# Patient Record
Sex: FEMALE | Race: WHITE | NOT HISPANIC OR LATINO | Employment: OTHER | URBAN - METROPOLITAN AREA
[De-identification: names, ages, dates, MRNs, and addresses within clinical notes are randomized per-mention and may not be internally consistent; named-entity substitution may affect disease eponyms.]

---

## 2022-07-12 ENCOUNTER — OFFICE VISIT (OUTPATIENT)
Dept: PLASTIC SURGERY | Facility: CLINIC | Age: 72
End: 2022-07-12
Payer: COMMERCIAL

## 2022-07-12 VITALS
HEIGHT: 63 IN | SYSTOLIC BLOOD PRESSURE: 161 MMHG | HEART RATE: 65 BPM | DIASTOLIC BLOOD PRESSURE: 84 MMHG | BODY MASS INDEX: 33.66 KG/M2 | TEMPERATURE: 97.1 F | WEIGHT: 190 LBS

## 2022-07-12 DIAGNOSIS — Z98.890 HISTORY OF BREAST RECONSTRUCTION: Primary | ICD-10-CM

## 2022-07-12 PROCEDURE — 99203 OFFICE O/P NEW LOW 30 MIN: CPT | Performed by: PLASTIC SURGERY

## 2022-07-12 RX ORDER — GABAPENTIN 100 MG/1
CAPSULE ORAL
COMMUNITY
Start: 2022-07-07

## 2022-07-12 RX ORDER — MULTIVITAMIN
TABLET ORAL DAILY
COMMUNITY

## 2022-07-12 RX ORDER — CITALOPRAM 20 MG/1
20 TABLET ORAL DAILY
COMMUNITY
Start: 2022-03-11

## 2022-07-12 RX ORDER — MEDICAL SUPPLY, MISCELLANEOUS
EACH MISCELLANEOUS
COMMUNITY

## 2022-07-12 RX ORDER — FLUTICASONE PROPIONATE 50 MCG
1 SPRAY, SUSPENSION (ML) NASAL DAILY
COMMUNITY

## 2022-07-12 RX ORDER — OMEPRAZOLE 20 MG/1
20 CAPSULE, DELAYED RELEASE ORAL DAILY
COMMUNITY
Start: 2022-06-10

## 2022-07-12 RX ORDER — BIOTIN 5 MG
5000 TABLET ORAL DAILY
COMMUNITY

## 2022-07-12 RX ORDER — DOXEPIN HYDROCHLORIDE 6 MG/1
TABLET ORAL
COMMUNITY
Start: 2022-06-22

## 2022-07-12 RX ORDER — BENZONATATE 100 MG/1
CAPSULE ORAL
COMMUNITY
Start: 2022-05-26

## 2022-07-12 NOTE — PROGRESS NOTES
Patient is a 53-year-old female who is known to me from my previous practice for bilateral breast reconstruction with tissue expander placement, the patient was initially scheduled for bilateral LIVE reconstruction, however the patient needed to be rescheduled due to the COVID-19 pandemic as well as the relocation of my practice, the patient is here today to reestablish care, she is interested in pursuing the next stages of reconstruction  She is no changes to her history, she is no other questions, concerns or complaints  Physical exam-patient with very good shape and contour, she is well-healed mastectomy incisions, her expanders are in good position  Discussion we we discussed the option of autologous reconstruction, the patient states that she would like to be relatively smaller than her current breast size, I discussed with her that this would be possible however she would likely need a flap debulking down the road as I believe that her current abdominal tissue would likely match closely to her current breast volume, otherwise the patient remains very good candidate for autologous flap reconstruction, we will begin to make arrangements for surgical planning      Counseling dominated visit, total counseling time 25 minutes, total of his time 30 minutes

## 2022-09-01 ENCOUNTER — ANESTHESIA EVENT (OUTPATIENT)
Dept: PERIOP | Facility: HOSPITAL | Age: 72
End: 2022-09-01

## 2022-09-21 ENCOUNTER — ANESTHESIA (OUTPATIENT)
Dept: PERIOP | Facility: HOSPITAL | Age: 72
End: 2022-09-21

## 2022-09-29 ENCOUNTER — HOSPITAL ENCOUNTER (OUTPATIENT)
Dept: RADIOLOGY | Facility: HOSPITAL | Age: 72
Discharge: HOME | End: 2022-09-29
Attending: RADIOLOGY

## 2022-09-29 DIAGNOSIS — Z76.89 REFERRAL OF PATIENT WITHOUT EXAMINATION OR TREATMENT: ICD-10-CM

## 2022-10-06 ENCOUNTER — TELEPHONE (OUTPATIENT)
Dept: PLASTIC SURGERY | Facility: CLINIC | Age: 72
End: 2022-10-06

## 2022-10-06 NOTE — TELEPHONE ENCOUNTER
Called patient 10/06 to move pre op appt due to Dr Chandrakant Valdez going into surgery   Can be moved to [facility]

## 2022-10-07 ENCOUNTER — APPOINTMENT (OUTPATIENT)
Dept: LAB | Facility: CLINIC | Age: 72
End: 2022-10-07
Payer: COMMERCIAL

## 2022-10-07 DIAGNOSIS — Z98.890 HISTORY OF BREAST RECONSTRUCTION: ICD-10-CM

## 2022-10-07 LAB
ANION GAP SERPL CALCULATED.3IONS-SCNC: 4 MMOL/L (ref 4–13)
BASOPHILS # BLD AUTO: 0.04 THOUSANDS/ΜL (ref 0–0.1)
BASOPHILS NFR BLD AUTO: 1 % (ref 0–1)
BUN SERPL-MCNC: 11 MG/DL (ref 5–25)
CALCIUM SERPL-MCNC: 9 MG/DL (ref 8.3–10.1)
CHLORIDE SERPL-SCNC: 107 MMOL/L (ref 96–108)
CO2 SERPL-SCNC: 28 MMOL/L (ref 21–32)
CREAT SERPL-MCNC: 1.27 MG/DL (ref 0.6–1.3)
EOSINOPHIL # BLD AUTO: 0.1 THOUSAND/ΜL (ref 0–0.61)
EOSINOPHIL NFR BLD AUTO: 1 % (ref 0–6)
ERYTHROCYTE [DISTWIDTH] IN BLOOD BY AUTOMATED COUNT: 14.1 % (ref 11.6–15.1)
GFR SERPL CREATININE-BSD FRML MDRD: 42 ML/MIN/1.73SQ M
GLUCOSE SERPL-MCNC: 106 MG/DL (ref 65–140)
HCT VFR BLD AUTO: 44.1 % (ref 34.8–46.1)
HGB BLD-MCNC: 13.8 G/DL (ref 11.5–15.4)
IMM GRANULOCYTES # BLD AUTO: 0.02 THOUSAND/UL (ref 0–0.2)
IMM GRANULOCYTES NFR BLD AUTO: 0 % (ref 0–2)
LYMPHOCYTES # BLD AUTO: 2.06 THOUSANDS/ΜL (ref 0.6–4.47)
LYMPHOCYTES NFR BLD AUTO: 27 % (ref 14–44)
MCH RBC QN AUTO: 28.1 PG (ref 26.8–34.3)
MCHC RBC AUTO-ENTMCNC: 31.3 G/DL (ref 31.4–37.4)
MCV RBC AUTO: 90 FL (ref 82–98)
MONOCYTES # BLD AUTO: 0.6 THOUSAND/ΜL (ref 0.17–1.22)
MONOCYTES NFR BLD AUTO: 8 % (ref 4–12)
NEUTROPHILS # BLD AUTO: 4.9 THOUSANDS/ΜL (ref 1.85–7.62)
NEUTS SEG NFR BLD AUTO: 63 % (ref 43–75)
NRBC BLD AUTO-RTO: 0 /100 WBCS
PLATELET # BLD AUTO: 177 THOUSANDS/UL (ref 149–390)
PMV BLD AUTO: 11.6 FL (ref 8.9–12.7)
POTASSIUM SERPL-SCNC: 3.8 MMOL/L (ref 3.5–5.3)
RBC # BLD AUTO: 4.91 MILLION/UL (ref 3.81–5.12)
SODIUM SERPL-SCNC: 139 MMOL/L (ref 135–147)
WBC # BLD AUTO: 7.72 THOUSAND/UL (ref 4.31–10.16)

## 2022-10-07 PROCEDURE — 36415 COLL VENOUS BLD VENIPUNCTURE: CPT

## 2022-10-07 PROCEDURE — 80048 BASIC METABOLIC PNL TOTAL CA: CPT

## 2022-10-07 PROCEDURE — 85025 COMPLETE CBC W/AUTO DIFF WBC: CPT

## 2022-10-17 ENCOUNTER — OFFICE VISIT (OUTPATIENT)
Dept: PLASTIC SURGERY | Facility: CLINIC | Age: 72
End: 2022-10-17

## 2022-10-17 ENCOUNTER — APPOINTMENT (OUTPATIENT)
Dept: RADIOLOGY | Facility: CLINIC | Age: 72
End: 2022-10-17
Payer: COMMERCIAL

## 2022-10-17 VITALS
RESPIRATION RATE: 16 BRPM | BODY MASS INDEX: 33.66 KG/M2 | DIASTOLIC BLOOD PRESSURE: 93 MMHG | HEART RATE: 63 BPM | SYSTOLIC BLOOD PRESSURE: 139 MMHG | TEMPERATURE: 97.9 F | HEIGHT: 63 IN | WEIGHT: 190 LBS

## 2022-10-17 DIAGNOSIS — Z01.818 PRE-OP EXAMINATION: ICD-10-CM

## 2022-10-17 DIAGNOSIS — Z98.890 S/P BREAST RECONSTRUCTION, BILATERAL: Primary | ICD-10-CM

## 2022-10-17 DIAGNOSIS — Z98.890 S/P BREAST RECONSTRUCTION, BILATERAL: ICD-10-CM

## 2022-10-17 PROCEDURE — PREOP: Performed by: PHYSICIAN ASSISTANT

## 2022-10-17 PROCEDURE — 71046 X-RAY EXAM CHEST 2 VIEWS: CPT

## 2022-10-17 NOTE — PROGRESS NOTES
Plastic Surgery Pre-Operative H&P:     Subjective:    Patient ID: Nagi Velazquez is a 81 y o  female  HPI Patient is a 81year old female who presents today for a preoperative history and physical  She is being seen for evaluation of bilateral breast reconstruction with LIVE flaps  Pt is S/P first stage breast reconstruction with placement of bilateral Allergan Austine [id number]-[id number]-T Tissue expanders, reinforcement of bilateral inframammary fold with large alloderm contour acellular dermal matrix, placement of bilateral LEONARDO non dme negative pressure wound therapy dressings, each less than 50cmsq  Expanders are filled to 800 mL bilaterally  She finished chemo on March 12, 2021  No radiation needed  Patient's past medical history is significant for prediabetes, RLS, cerebrovascular disease, GERD, stage 3 chronic kidney disease, sleep apnea and obesity  The patient does not take any anticoagulants, beta-blockers, or steroids  The patient is a nonsmoker  Patient denies having any medication allergies  She denies any pain, breast redness, breast swelling, n/v/d, fever, chills, SOB, weight changes, fatigue or other issues at this time  The patient does report that she has a family history of thrombocytopenia, but she does not have this  Other abdominal surgeries include laparoscopic hysterectomy  She is UTD on EKG and labs  Patient is compliant  No questions or concerns  Arlin Adams is scheduled for surgery on 11/30  No past medical history on file  There is no problem list on file for this patient        Current Outpatient Medications:   •  benzonatate (TESSALON PERLES) 100 mg capsule, , Disp: , Rfl:   •  Biotin 5 MG TABS, Take 5,000 mcg by mouth daily, Disp: , Rfl:   •  Cholecalciferol 25 MCG (1000 UT) capsule, Take 1,000 Units by mouth daily, Disp: , Rfl:   •  citalopram (CeleXA) 20 mg tablet, Take 20 mg by mouth daily, Disp: , Rfl:   •  Doxepin HCl 6 MG TABS, Take one prior to bedtime to help with insomnia, Disp: , Rfl:   •  Elastic Bandages & Supports (B-3 Extra High Comp Hose Women) MISC, Use, Disp: , Rfl:   •  fluticasone (FLONASE) 50 mcg/act nasal spray, 1 spray into each nostril daily, Disp: , Rfl:   •  gabapentin (NEURONTIN) 100 mg capsule, , Disp: , Rfl:   •  Multiple Vitamin (Multi Vitamin Daily) TABS, daily, Disp: , Rfl:   •  omeprazole (PriLOSEC) 20 mg delayed release capsule, Take 20 mg by mouth daily, Disp: , Rfl:     No past surgical history on file  Social History     Tobacco Use   • Smoking status: Never Smoker   • Smokeless tobacco: Never Used   Substance Use Topics   • Alcohol use: Yes     Comment: occasionally       Review of Systems    Per HPI      Objective:  /93 (BP Location: Right arm, Patient Position: Sitting, Cuff Size: Standard)   Pulse 63   Temp 97 9 °F (36 6 °C) (Tympanic)   Resp 16   Ht 5' 3" (1 6 m)   Wt 86 2 kg (190 lb)   BMI 33 66 kg/m²    Physical Exam      General: Well-developed, well-nourished 81year old female in no acute distress  HEENT: Head is normocephalic and atraumatic  EOMI  Mouth pink and moist   Cardiovascular: Heart is regular  Respiratory: Normal respiratory effort  Breasts: Symmetric, no skin changes, no dominant masses palpable, no supraclavicular, infraclavicular or axillary lymphadenopathy noted  Incisions are C/D/I with no evidence of surrounding erythema, drainage, skin necrosis, hematoma, seroma, or wound dehiscence  Nipples are absent  The flaps are viable  No signs of infection  Good tissue expander placement bilaterally  Healed well  Abdomen: Soft, non-distended, nontender  Bowel sounds are present in all 4 quadrants  There are no masses or organomegaly noted  Extremities: No edema or gross deformities  Skin: Warm, dry, without rashes or cyanosis  Neuro: Alert and oriented x 3        Assessment/Plan: 74-year-old female status post bilateral breast reconstruction present for preoperative history and physical for bilateral LVIE free flap reconstruction        Patient is scheduled for surgery on 11/30/2022  Patient is aware to go for preoperative chest x-ray  CTA, EKG, and labs reviewed  Postop appointment scheduled  The surgery was discussed in detail with the patient including the risks of bleeding, infection, scarring, asymmetry, partial or full flap failure, skin necrosis, umbilical necrosis, DVT, wound healing complications, hematoma, seroma, and need for additional procedures  Also discussed was the expected surgical time, the expected hospital stay, the expected recovery time, the use of drains, and the use of kellie dressings  The patient understands and agrees to the plan, no additional questions at this time  Surgical consent signed          Rufino Cosme PA-C  Plastic & Reconstructive Surgery

## 2022-11-23 NOTE — PRE-PROCEDURE INSTRUCTIONS
Pre-Surgery Instructions:   Medication Instructions   • Biotin 5 MG TABS Stop taking 7 days prior to surgery  • Cholecalciferol 25 MCG (1000 UT) capsule Stop taking 7 days prior to surgery  • citalopram (CeleXA) 20 mg tablet Take day of surgery  • Doxepin HCl 6 MG TABS Take night before surgery   • fluticasone (FLONASE) 50 mcg/act nasal spray Take day of surgery  • gabapentin (NEURONTIN) 100 mg capsule Take night before surgery   • Multiple Vitamin (Multi Vitamin Daily) TABS Stop taking 7 days prior to surgery  • omeprazole (PriLOSEC) 20 mg delayed release capsule Take day of surgery  See Geriatric Assessment below       • Cognitive Assessment: no concerns  • CAM: n/a  • TUG <15 sec:n/a  • Falls (last 6 months): none  • Hand  score:n/a  • Avi Total Score: 23  • PHQ- 9 Depression Scale:0  • Nutrition Assessment Score:14  • METS:8 33  • Health goals:  -What are your overall health goals? (quit smoking, wt  loss, rest, decrease stress) weight loss    -What brings you strength? (family, friends, Rest[facility], health) family    -What activities are important to you? (exercise, reading, travel, work)    - Reviewed with patient, in detail, instructions from "My Surgical Experience"  - Instructed to avoid all OTC vitamins/supplements and NSAIDS for one week prior to surgery per anesthesia guidelines  Tylenol ok to take PRN  - Advised patient nothing eat or drink after midnight prior to surgery, except medications that he/she is to take morning DOS with only small sip of water     - Advised patient that Isaías Hoffmann will call with surgery arrival time and hospital directions the business day prior to surgery  Patient verbalized understanding of current visitor restrictions/masking guidelines and advised that he/she can confirm these at time of arrival call with Isaías Hoffmann      - Patient verbalized understanding and knows to call surgeon's office with any additional questions prior to surgery   Instructed to call surgeon's office in meantime with any new illnesses/exposure, patient verbalized understanding

## 2022-11-28 DIAGNOSIS — Z01.818 PREOP EXAMINATION: Primary | ICD-10-CM

## 2022-11-29 ENCOUNTER — HOSPITAL ENCOUNTER (OUTPATIENT)
Dept: RADIOLOGY | Age: 72
Discharge: HOME | End: 2022-11-29

## 2022-11-29 DIAGNOSIS — Z01.818 PREOP EXAMINATION: ICD-10-CM

## 2022-11-29 PROBLEM — K21.9 GERD (GASTROESOPHAGEAL REFLUX DISEASE): Status: ACTIVE | Noted: 2022-11-29

## 2022-11-29 PROBLEM — Z99.89 CPAP (CONTINUOUS POSITIVE AIRWAY PRESSURE) DEPENDENCE: Status: ACTIVE | Noted: 2022-11-29

## 2022-11-29 PROBLEM — C50.919 BREAST CANCER (HCC): Status: ACTIVE | Noted: 2022-11-29

## 2022-11-29 PROBLEM — N18.31 CHRONIC KIDNEY DISEASE, STAGE 3A (HCC): Status: ACTIVE | Noted: 2022-11-29

## 2022-11-29 PROBLEM — E66.9 OBESITY: Status: ACTIVE | Noted: 2022-11-29

## 2022-11-29 PROBLEM — R73.03 PREDIABETES: Status: ACTIVE | Noted: 2022-11-29

## 2022-11-29 PROBLEM — Z42.1 ADMISSION FOR BREAST RECONSTRUCTION FOLLOWING MASTECTOMY: Status: ACTIVE | Noted: 2022-11-29

## 2022-11-29 RX ORDER — DIPHENHYDRAMINE HYDROCHLORIDE 50 MG/ML
12.5 INJECTION INTRAMUSCULAR; INTRAVENOUS ONCE AS NEEDED
Status: CANCELLED | OUTPATIENT
Start: 2022-11-29

## 2022-11-29 RX ORDER — FENTANYL CITRATE/PF 50 MCG/ML
50 SYRINGE (ML) INJECTION
Status: CANCELLED | OUTPATIENT
Start: 2022-11-29

## 2022-11-29 RX ORDER — METOCLOPRAMIDE HYDROCHLORIDE 5 MG/ML
10 INJECTION INTRAMUSCULAR; INTRAVENOUS ONCE AS NEEDED
Status: CANCELLED | OUTPATIENT
Start: 2022-11-29

## 2022-11-29 RX ORDER — HYDROMORPHONE HCL IN WATER/PF 6 MG/30 ML
0.2 PATIENT CONTROLLED ANALGESIA SYRINGE INTRAVENOUS
Status: CANCELLED | OUTPATIENT
Start: 2022-11-29

## 2022-11-29 RX ORDER — HYDROMORPHONE HCL/PF 1 MG/ML
0.5 SYRINGE (ML) INJECTION
Status: CANCELLED | OUTPATIENT
Start: 2022-11-29

## 2022-11-29 RX ORDER — ONDANSETRON 2 MG/ML
4 INJECTION INTRAMUSCULAR; INTRAVENOUS ONCE AS NEEDED
Status: CANCELLED | OUTPATIENT
Start: 2022-11-29

## 2022-11-29 RX ADMIN — IOHEXOL 100 ML: 350 INJECTION, SOLUTION INTRAVENOUS at 14:40

## 2022-11-29 NOTE — H&P
Plastic Surgery Pre-Operative H&P:      Subjective:    Patient ID: Jase Huddleston is a 81 y o  female  HPI Patient is a 81year old female who presents today for a preoperative history and physical  She is being seen for evaluation of bilateral breast reconstruction with LIVE flaps  Pt is S/P first stage breast reconstruction with placement of bilateral Ajayan Austine [id number] Tissue expanders, reinforcement of bilateral inframammary fold with large alloderm contour acellular dermal matrix, placement of bilateral LEONARDO non dme negative pressure wound therapy dressings, each less than 50cmsq  Expanders are filled to 800 mL bilaterally  She finished chemo on March 12, 2021  No radiation needed  Patient's past medical history is significant for prediabetes, RLS, cerebrovascular disease, GERD, stage 3 chronic kidney disease, sleep apnea and obesity  The patient does not take any anticoagulants, beta-blockers, or steroids  The patient is a nonsmoker  Patient denies having any medication allergies  She denies any pain, breast redness, breast swelling, n/v/d, fever, chills, SOB, weight changes, fatigue or other issues at this time  The patient does report that she has a family history of thrombocytopenia, but she does not have this  Other abdominal surgeries include laparoscopic hysterectomy  She is UTD on EKG and labs  Patient is compliant  No questions or concerns   Oralia Francine is scheduled for surgery on 11/30           Medical History   No past medical history on file         There is no problem list on file for this patient         Current Outpatient Medications:   •  benzonatate (TESSALON PERLES) 100 mg capsule, , Disp: , Rfl:   •  Biotin 5 MG TABS, Take 5,000 mcg by mouth daily, Disp: , Rfl:   •  Cholecalciferol 25 MCG (1000 UT) capsule, Take 1,000 Units by mouth daily, Disp: , Rfl:   •  citalopram (CeleXA) 20 mg tablet, Take 20 mg by mouth daily, Disp: , Rfl:   •  Doxepin HCl 6 MG TABS, Take one prior to bedtime to help with insomnia, Disp: , Rfl:   •  Elastic Bandages & Supports (B-3 Extra High Comp Hose Women) MISC, Use, Disp: , Rfl:   •  fluticasone (FLONASE) 50 mcg/act nasal spray, 1 spray into each nostril daily, Disp: , Rfl:   •  gabapentin (NEURONTIN) 100 mg capsule, , Disp: , Rfl:   •  Multiple Vitamin (Multi Vitamin Daily) TABS, daily, Disp: , Rfl:   •  omeprazole (PriLOSEC) 20 mg delayed release capsule, Take 20 mg by mouth daily, Disp: , Rfl:      Surgical History   No past surgical history on file         Social History            Tobacco Use   • Smoking status: Never Smoker   • Smokeless tobacco: Never Used   Substance Use Topics   • Alcohol use: Yes       Comment: occasionally         Review of Systems    Per HPI        Objective:  /93 (BP Location: Right arm, Patient Position: Sitting, Cuff Size: Standard)   Pulse 63   Temp 97 9 °F (36 6 °C) (Tympanic)   Resp 16   Ht 5' 3" (1 6 m)   Wt 86 2 kg (190 lb)   BMI 33 66 kg/m²    Physical Exam       General: Well-developed, well-nourished 81year old female in no acute distress  HEENT: Head is normocephalic and atraumatic  EOMI  Mouth pink and moist   Cardiovascular: Heart is regular  Respiratory: Normal respiratory effort  Breasts: Symmetric, no skin changes, no dominant masses palpable, no supraclavicular, infraclavicular or axillary lymphadenopathy noted  Incisions are C/D/I with no evidence of surrounding erythema, drainage, skin necrosis, hematoma, seroma, or wound dehiscence  Nipples are absent  The flaps are viable  No signs of infection  Good tissue expander placement bilaterally  Healed well  Abdomen: Soft, non-distended, nontender  Bowel sounds are present in all 4 quadrants  There are no masses or organomegaly noted  Extremities: No edema or gross deformities  Skin: Warm, dry, without rashes or cyanosis    Neuro: Alert and oriented x 3         Assessment/Plan: 46-year-old female status post bilateral breast reconstruction present for preoperative history and physical for bilateral LIVE free flap reconstruction           Patient is scheduled for surgery on 11/30/2022  Patient is aware to go for preoperative chest x-ray  CTA, EKG, and labs reviewed  Postop appointment scheduled  The surgery was discussed in detail with the patient including the risks of bleeding, infection, scarring, asymmetry, partial or full flap failure, skin necrosis, umbilical necrosis, DVT, wound healing complications, hematoma, seroma, and need for additional procedures  Also discussed was the expected surgical time, the expected hospital stay, the expected recovery time, the use of drains, and the use of kellie dressings  The patient understands and agrees to the plan, no additional questions at this time    Surgical consent signed            Richard Lopez PA-C  Plastic & Reconstructive Surgery

## 2022-11-30 ENCOUNTER — HOSPITAL ENCOUNTER (INPATIENT)
Facility: HOSPITAL | Age: 72
LOS: 4 days | Discharge: HOME | End: 2022-12-04
Attending: PLASTIC SURGERY | Admitting: PLASTIC SURGERY

## 2022-11-30 DIAGNOSIS — N28.9 RENAL INSUFFICIENCY: ICD-10-CM

## 2022-11-30 DIAGNOSIS — Z98.890 HISTORY OF BREAST RECONSTRUCTION: ICD-10-CM

## 2022-11-30 DIAGNOSIS — Z98.890 S/P BREAST RECONSTRUCTION, BILATERAL: Primary | ICD-10-CM

## 2022-11-30 LAB
ABO GROUP BLD: NORMAL
ABO GROUP BLD: NORMAL
ANION GAP SERPL CALCULATED.3IONS-SCNC: 8 MMOL/L (ref 4–13)
BASE EXCESS BLDA CALC-SCNC: -1 MMOL/L (ref -2–3)
BASE EXCESS BLDA CALC-SCNC: -2 MMOL/L (ref -2–3)
BASE EXCESS BLDA CALC-SCNC: -2 MMOL/L (ref -2–3)
BASE EXCESS BLDA CALC-SCNC: -3 MMOL/L (ref -2–3)
BASE EXCESS BLDA CALC-SCNC: -5.5 MMOL/L
BLD GP AB SCN SERPL QL: NEGATIVE
BUN SERPL-MCNC: 10 MG/DL (ref 5–25)
CA-I BLD-SCNC: 1.06 MMOL/L (ref 1.12–1.32)
CA-I BLD-SCNC: 1.07 MMOL/L (ref 1.12–1.32)
CA-I BLD-SCNC: 1.09 MMOL/L (ref 1.12–1.32)
CA-I BLD-SCNC: 1.11 MMOL/L (ref 1.12–1.32)
CALCIUM SERPL-MCNC: 7.7 MG/DL (ref 8.3–10.1)
CHLORIDE SERPL-SCNC: 109 MMOL/L (ref 96–108)
CO2 SERPL-SCNC: 21 MMOL/L (ref 21–32)
CREAT SERPL-MCNC: 1.35 MG/DL (ref 0.6–1.3)
ERYTHROCYTE [DISTWIDTH] IN BLOOD BY AUTOMATED COUNT: 14.2 % (ref 11.6–15.1)
GFR SERPL CREATININE-BSD FRML MDRD: 39 ML/MIN/1.73SQ M
GLUCOSE SERPL-MCNC: 159 MG/DL (ref 65–140)
GLUCOSE SERPL-MCNC: 175 MG/DL (ref 65–140)
GLUCOSE SERPL-MCNC: 177 MG/DL (ref 65–140)
GLUCOSE SERPL-MCNC: 180 MG/DL (ref 65–140)
GLUCOSE SERPL-MCNC: 192 MG/DL (ref 65–140)
HCO3 BLDA-SCNC: 19.6 MMOL/L (ref 22–28)
HCO3 BLDA-SCNC: 21.6 MMOL/L (ref 24–30)
HCO3 BLDA-SCNC: 23 MMOL/L (ref 22–28)
HCO3 BLDA-SCNC: 23 MMOL/L (ref 24–30)
HCO3 BLDA-SCNC: 23.8 MMOL/L (ref 24–30)
HCT VFR BLD AUTO: 31.1 % (ref 34.8–46.1)
HCT VFR BLD CALC: 30 % (ref 34.8–46.1)
HCT VFR BLD CALC: 30 % (ref 34.8–46.1)
HCT VFR BLD CALC: 31 % (ref 34.8–46.1)
HCT VFR BLD CALC: 36 % (ref 34.8–46.1)
HGB BLD-MCNC: 9.8 G/DL (ref 11.5–15.4)
HGB BLDA-MCNC: 10.2 G/DL (ref 11.5–15.4)
HGB BLDA-MCNC: 10.2 G/DL (ref 11.5–15.4)
HGB BLDA-MCNC: 10.5 G/DL (ref 11.5–15.4)
HGB BLDA-MCNC: 12.2 G/DL (ref 11.5–15.4)
LACTATE SERPL-SCNC: 3.9 MMOL/L (ref 0.5–2)
MAGNESIUM SERPL-MCNC: 2 MG/DL (ref 1.6–2.6)
MCH RBC QN AUTO: 28.4 PG (ref 26.8–34.3)
MCHC RBC AUTO-ENTMCNC: 31.5 G/DL (ref 31.4–37.4)
MCV RBC AUTO: 90 FL (ref 82–98)
NASAL CANNULA: 2
O2 CT BLDA-SCNC: 14.7 ML/DL (ref 16–23)
OXYHGB MFR BLDA: 96.5 % (ref 94–97)
PCO2 BLD: 23 MMOL/L (ref 21–32)
PCO2 BLD: 24 MMOL/L (ref 21–32)
PCO2 BLD: 24 MMOL/L (ref 21–32)
PCO2 BLD: 25 MMOL/L (ref 21–32)
PCO2 BLD: 34.8 MM HG (ref 42–50)
PCO2 BLD: 38.9 MM HG (ref 42–50)
PCO2 BLD: 39.8 MM HG (ref 42–50)
PCO2 BLD: 40.6 MM HG (ref 36–44)
PCO2 BLDA: 36.8 MM HG (ref 36–44)
PH BLD: 7.36 [PH] (ref 7.35–7.45)
PH BLD: 7.38 [PH] (ref 7.3–7.4)
PH BLD: 7.39 [PH] (ref 7.3–7.4)
PH BLD: 7.4 [PH] (ref 7.3–7.4)
PH BLDA: 7.34 [PH] (ref 7.35–7.45)
PHOSPHATE SERPL-MCNC: 3.2 MG/DL (ref 2.3–4.1)
PLATELET # BLD AUTO: 127 THOUSANDS/UL (ref 149–390)
PMV BLD AUTO: 11.1 FL (ref 8.9–12.7)
PO2 BLD: 106 MM HG (ref 35–45)
PO2 BLD: 118 MM HG (ref 35–45)
PO2 BLD: 127 MM HG (ref 35–45)
PO2 BLD: 136 MM HG (ref 75–129)
PO2 BLDA: 101.1 MM HG (ref 75–129)
POTASSIUM BLD-SCNC: 4 MMOL/L (ref 3.5–5.3)
POTASSIUM BLD-SCNC: 4 MMOL/L (ref 3.5–5.3)
POTASSIUM BLD-SCNC: 4.2 MMOL/L (ref 3.5–5.3)
POTASSIUM BLD-SCNC: 4.2 MMOL/L (ref 3.5–5.3)
POTASSIUM SERPL-SCNC: 4.1 MMOL/L (ref 3.5–5.3)
RBC # BLD AUTO: 3.45 MILLION/UL (ref 3.81–5.12)
RH BLD: NEGATIVE
RH BLD: NEGATIVE
SAO2 % BLD FROM PO2: 98 % (ref 60–85)
SAO2 % BLD FROM PO2: 99 % (ref 60–85)
SODIUM BLD-SCNC: 139 MMOL/L (ref 136–145)
SODIUM SERPL-SCNC: 138 MMOL/L (ref 135–147)
SPECIMEN EXPIRATION DATE: NORMAL
SPECIMEN SOURCE: ABNORMAL
WBC # BLD AUTO: 13.73 THOUSAND/UL (ref 4.31–10.16)

## 2022-11-30 PROCEDURE — 4A1GXSH MONITORING OF SKIN AND BREAST VASCULAR PERFUSION USING INDOCYANINE GREEN DYE, EXTERNAL APPROACH: ICD-10-PCS | Performed by: PLASTIC SURGERY

## 2022-11-30 PROCEDURE — 0HPT0NZ REMOVAL OF TISSUE EXPANDER FROM RIGHT BREAST, OPEN APPROACH: ICD-10-PCS | Performed by: PLASTIC SURGERY

## 2022-11-30 PROCEDURE — 0HRV077 REPLACEMENT OF BILATERAL BREAST USING DEEP INFERIOR EPIGASTRIC ARTERY PERFORATOR FLAP, OPEN APPROACH: ICD-10-PCS | Performed by: PLASTIC SURGERY

## 2022-11-30 PROCEDURE — 0HPU0NZ REMOVAL OF TISSUE EXPANDER FROM LEFT BREAST, OPEN APPROACH: ICD-10-PCS | Performed by: PLASTIC SURGERY

## 2022-11-30 RX ORDER — ACETAMINOPHEN 325 MG/1
975 TABLET ORAL ONCE
Status: COMPLETED | OUTPATIENT
Start: 2022-11-30 | End: 2022-11-30

## 2022-11-30 RX ORDER — PANTOPRAZOLE SODIUM 20 MG/1
20 TABLET, DELAYED RELEASE ORAL
Status: DISCONTINUED | OUTPATIENT
Start: 2022-12-01 | End: 2022-12-04 | Stop reason: HOSPADM

## 2022-11-30 RX ORDER — HYDROMORPHONE HYDROCHLORIDE 2 MG/ML
INJECTION, SOLUTION INTRAMUSCULAR; INTRAVENOUS; SUBCUTANEOUS AS NEEDED
Status: DISCONTINUED | OUTPATIENT
Start: 2022-11-30 | End: 2022-11-30

## 2022-11-30 RX ORDER — DOCUSATE SODIUM 100 MG/1
100 CAPSULE, LIQUID FILLED ORAL 2 TIMES DAILY
Status: DISCONTINUED | OUTPATIENT
Start: 2022-11-30 | End: 2022-12-02

## 2022-11-30 RX ORDER — DEXAMETHASONE SODIUM PHOSPHATE 10 MG/ML
INJECTION, SOLUTION INTRAMUSCULAR; INTRAVENOUS AS NEEDED
Status: DISCONTINUED | OUTPATIENT
Start: 2022-11-30 | End: 2022-11-30

## 2022-11-30 RX ORDER — OXYCODONE HYDROCHLORIDE 5 MG/1
5 TABLET ORAL EVERY 4 HOURS PRN
Status: DISCONTINUED | OUTPATIENT
Start: 2022-11-30 | End: 2022-12-04 | Stop reason: HOSPADM

## 2022-11-30 RX ORDER — SODIUM CHLORIDE, SODIUM GLUCONATE, SODIUM ACETATE, POTASSIUM CHLORIDE, MAGNESIUM CHLORIDE, SODIUM PHOSPHATE, DIBASIC, AND POTASSIUM PHOSPHATE .53; .5; .37; .037; .03; .012; .00082 G/100ML; G/100ML; G/100ML; G/100ML; G/100ML; G/100ML; G/100ML
1000 INJECTION, SOLUTION INTRAVENOUS ONCE
Status: COMPLETED | OUTPATIENT
Start: 2022-12-01 | End: 2022-12-01

## 2022-11-30 RX ORDER — DOXEPIN HYDROCHLORIDE 6 MG/1
6 TABLET ORAL
Status: DISCONTINUED | OUTPATIENT
Start: 2022-11-30 | End: 2022-12-04 | Stop reason: HOSPADM

## 2022-11-30 RX ORDER — GABAPENTIN 300 MG/1
300 CAPSULE ORAL 3 TIMES DAILY
Status: DISCONTINUED | OUTPATIENT
Start: 2022-11-30 | End: 2022-12-04 | Stop reason: HOSPADM

## 2022-11-30 RX ORDER — ASPIRIN 325 MG
325 TABLET ORAL DAILY
Status: DISCONTINUED | OUTPATIENT
Start: 2022-12-01 | End: 2022-12-04 | Stop reason: HOSPADM

## 2022-11-30 RX ORDER — GABAPENTIN 300 MG/1
300 CAPSULE ORAL ONCE
Status: COMPLETED | OUTPATIENT
Start: 2022-11-30 | End: 2022-11-30

## 2022-11-30 RX ORDER — ROCURONIUM BROMIDE 10 MG/ML
INJECTION, SOLUTION INTRAVENOUS AS NEEDED
Status: DISCONTINUED | OUTPATIENT
Start: 2022-11-30 | End: 2022-11-30

## 2022-11-30 RX ORDER — PROPOFOL 10 MG/ML
INJECTION, EMULSION INTRAVENOUS AS NEEDED
Status: DISCONTINUED | OUTPATIENT
Start: 2022-11-30 | End: 2022-11-30

## 2022-11-30 RX ORDER — LABETALOL HYDROCHLORIDE 5 MG/ML
10 INJECTION, SOLUTION INTRAVENOUS EVERY 6 HOURS PRN
Status: DISCONTINUED | OUTPATIENT
Start: 2022-11-30 | End: 2022-12-04 | Stop reason: HOSPADM

## 2022-11-30 RX ORDER — SODIUM CHLORIDE 9 MG/ML
INJECTION, SOLUTION INTRAVENOUS CONTINUOUS PRN
Status: DISCONTINUED | OUTPATIENT
Start: 2022-11-30 | End: 2022-11-30

## 2022-11-30 RX ORDER — EPHEDRINE SULFATE 50 MG/ML
INJECTION INTRAVENOUS AS NEEDED
Status: DISCONTINUED | OUTPATIENT
Start: 2022-11-30 | End: 2022-11-30

## 2022-11-30 RX ORDER — GLYCOPYRROLATE 0.2 MG/ML
INJECTION INTRAMUSCULAR; INTRAVENOUS AS NEEDED
Status: DISCONTINUED | OUTPATIENT
Start: 2022-11-30 | End: 2022-11-30

## 2022-11-30 RX ORDER — DIPHENHYDRAMINE HYDROCHLORIDE 50 MG/ML
25 INJECTION INTRAMUSCULAR; INTRAVENOUS EVERY 6 HOURS PRN
Status: DISCONTINUED | OUTPATIENT
Start: 2022-11-30 | End: 2022-12-04 | Stop reason: HOSPADM

## 2022-11-30 RX ORDER — CEFAZOLIN SODIUM 2 G/50ML
2000 SOLUTION INTRAVENOUS EVERY 8 HOURS
Status: COMPLETED | OUTPATIENT
Start: 2022-11-30 | End: 2022-12-02

## 2022-11-30 RX ORDER — SODIUM CHLORIDE, SODIUM LACTATE, POTASSIUM CHLORIDE, CALCIUM CHLORIDE 600; 310; 30; 20 MG/100ML; MG/100ML; MG/100ML; MG/100ML
125 INJECTION, SOLUTION INTRAVENOUS CONTINUOUS
Status: DISCONTINUED | OUTPATIENT
Start: 2022-11-30 | End: 2022-11-30

## 2022-11-30 RX ORDER — ENOXAPARIN SODIUM 100 MG/ML
40 INJECTION SUBCUTANEOUS DAILY
Status: DISCONTINUED | OUTPATIENT
Start: 2022-12-01 | End: 2022-12-04 | Stop reason: HOSPADM

## 2022-11-30 RX ORDER — ACETAMINOPHEN 325 MG/1
975 TABLET ORAL EVERY 6 HOURS SCHEDULED
Status: DISCONTINUED | OUTPATIENT
Start: 2022-11-30 | End: 2022-12-04 | Stop reason: HOSPADM

## 2022-11-30 RX ORDER — ENOXAPARIN SODIUM 100 MG/ML
40 INJECTION SUBCUTANEOUS ONCE
Status: COMPLETED | OUTPATIENT
Start: 2022-11-30 | End: 2022-11-30

## 2022-11-30 RX ORDER — FENTANYL CITRATE 50 UG/ML
INJECTION, SOLUTION INTRAMUSCULAR; INTRAVENOUS AS NEEDED
Status: DISCONTINUED | OUTPATIENT
Start: 2022-11-30 | End: 2022-11-30

## 2022-11-30 RX ORDER — ONDANSETRON 2 MG/ML
INJECTION INTRAMUSCULAR; INTRAVENOUS AS NEEDED
Status: DISCONTINUED | OUTPATIENT
Start: 2022-11-30 | End: 2022-11-30

## 2022-11-30 RX ORDER — MIDAZOLAM HYDROCHLORIDE 2 MG/2ML
INJECTION, SOLUTION INTRAMUSCULAR; INTRAVENOUS AS NEEDED
Status: DISCONTINUED | OUTPATIENT
Start: 2022-11-30 | End: 2022-11-30

## 2022-11-30 RX ORDER — SODIUM CHLORIDE 9 MG/ML
125 INJECTION, SOLUTION INTRAVENOUS CONTINUOUS
Status: DISCONTINUED | OUTPATIENT
Start: 2022-11-30 | End: 2022-11-30

## 2022-11-30 RX ORDER — CYCLOBENZAPRINE HCL 10 MG
10 TABLET ORAL 3 TIMES DAILY
Status: DISCONTINUED | OUTPATIENT
Start: 2022-11-30 | End: 2022-12-04 | Stop reason: HOSPADM

## 2022-11-30 RX ORDER — CITALOPRAM 20 MG/1
20 TABLET ORAL EVERY MORNING
Status: DISCONTINUED | OUTPATIENT
Start: 2022-11-30 | End: 2022-12-04 | Stop reason: HOSPADM

## 2022-11-30 RX ORDER — LIDOCAINE HYDROCHLORIDE 20 MG/ML
INJECTION, SOLUTION EPIDURAL; INFILTRATION; INTRACAUDAL; PERINEURAL AS NEEDED
Status: DISCONTINUED | OUTPATIENT
Start: 2022-11-30 | End: 2022-11-30

## 2022-11-30 RX ORDER — SODIUM CHLORIDE 9 MG/ML
100 INJECTION, SOLUTION INTRAVENOUS CONTINUOUS
Status: DISCONTINUED | OUTPATIENT
Start: 2022-11-30 | End: 2022-12-01

## 2022-11-30 RX ORDER — ONDANSETRON 2 MG/ML
4 INJECTION INTRAMUSCULAR; INTRAVENOUS EVERY 6 HOURS PRN
Status: DISCONTINUED | OUTPATIENT
Start: 2022-11-30 | End: 2022-12-04 | Stop reason: HOSPADM

## 2022-11-30 RX ORDER — HYDROMORPHONE HCL/PF 1 MG/ML
0.5 SYRINGE (ML) INJECTION EVERY 4 HOURS PRN
Status: DISCONTINUED | OUTPATIENT
Start: 2022-11-30 | End: 2022-12-04 | Stop reason: HOSPADM

## 2022-11-30 RX ORDER — CEFAZOLIN SODIUM 2 G/50ML
2000 SOLUTION INTRAVENOUS ONCE
Status: COMPLETED | OUTPATIENT
Start: 2022-11-30 | End: 2022-11-30

## 2022-11-30 RX ORDER — INDOCYANINE GREEN AND WATER 25 MG
KIT INJECTION AS NEEDED
Status: DISCONTINUED | OUTPATIENT
Start: 2022-11-30 | End: 2022-11-30

## 2022-11-30 RX ORDER — LIDOCAINE HYDROCHLORIDE 10 MG/ML
0.5 INJECTION, SOLUTION EPIDURAL; INFILTRATION; INTRACAUDAL; PERINEURAL ONCE AS NEEDED
Status: DISCONTINUED | OUTPATIENT
Start: 2022-11-30 | End: 2022-11-30 | Stop reason: HOSPADM

## 2022-11-30 RX ORDER — OXYCODONE HYDROCHLORIDE 5 MG/1
2.5 TABLET ORAL EVERY 4 HOURS PRN
Status: DISCONTINUED | OUTPATIENT
Start: 2022-11-30 | End: 2022-12-04 | Stop reason: HOSPADM

## 2022-11-30 RX ORDER — ALBUMIN, HUMAN INJ 5% 5 %
SOLUTION INTRAVENOUS CONTINUOUS PRN
Status: DISCONTINUED | OUTPATIENT
Start: 2022-11-30 | End: 2022-11-30

## 2022-11-30 RX ADMIN — Medication 50 MG: at 08:41

## 2022-11-30 RX ADMIN — CEFAZOLIN SODIUM 2000 MG: 2 SOLUTION INTRAVENOUS at 09:28

## 2022-11-30 RX ADMIN — ROCURONIUM BROMIDE 50 MG: 50 INJECTION INTRAVENOUS at 08:41

## 2022-11-30 RX ADMIN — ONDANSETRON 4 MG: 2 INJECTION INTRAMUSCULAR; INTRAVENOUS at 08:47

## 2022-11-30 RX ADMIN — CEFAZOLIN SODIUM 2000 MG: 2 SOLUTION INTRAVENOUS at 17:26

## 2022-11-30 RX ADMIN — INDOCYANINE GREEN AND WATER 7.5 MG: KIT at 12:59

## 2022-11-30 RX ADMIN — ROCURONIUM BROMIDE 20 MG: 50 INJECTION INTRAVENOUS at 10:35

## 2022-11-30 RX ADMIN — SODIUM CHLORIDE: 0.9 INJECTION, SOLUTION INTRAVENOUS at 09:12

## 2022-11-30 RX ADMIN — ENOXAPARIN SODIUM 40 MG: 40 INJECTION SUBCUTANEOUS at 08:25

## 2022-11-30 RX ADMIN — ROCURONIUM BROMIDE 20 MG: 50 INJECTION INTRAVENOUS at 12:00

## 2022-11-30 RX ADMIN — CEFAZOLIN SODIUM 2000 MG: 2 SOLUTION INTRAVENOUS at 22:41

## 2022-11-30 RX ADMIN — ALBUMIN (HUMAN): 12.5 INJECTION, SOLUTION INTRAVENOUS at 09:13

## 2022-11-30 RX ADMIN — SODIUM CHLORIDE, SODIUM LACTATE, POTASSIUM CHLORIDE, AND CALCIUM CHLORIDE: .6; .31; .03; .02 INJECTION, SOLUTION INTRAVENOUS at 08:17

## 2022-11-30 RX ADMIN — CYCLOBENZAPRINE HYDROCHLORIDE 10 MG: 10 TABLET, FILM COATED ORAL at 22:38

## 2022-11-30 RX ADMIN — KETAMINE HYDROCHLORIDE 0.1 MG/KG/HR: 50 INJECTION, SOLUTION INTRAMUSCULAR; INTRAVENOUS at 09:18

## 2022-11-30 RX ADMIN — CEFAZOLIN SODIUM 2000 MG: 2 SOLUTION INTRAVENOUS at 13:21

## 2022-11-30 RX ADMIN — GABAPENTIN 300 MG: 300 CAPSULE ORAL at 07:27

## 2022-11-30 RX ADMIN — DEXAMETHASONE SODIUM PHOSPHATE 10 MG: 10 INJECTION INTRAMUSCULAR; INTRAVENOUS at 08:47

## 2022-11-30 RX ADMIN — EPHEDRINE SULFATE 5 MG: 50 INJECTION INTRAVENOUS at 16:39

## 2022-11-30 RX ADMIN — ROCURONIUM BROMIDE 20 MG: 50 INJECTION INTRAVENOUS at 12:40

## 2022-11-30 RX ADMIN — FENTANYL CITRATE 100 MCG: 50 INJECTION INTRAMUSCULAR; INTRAVENOUS at 19:19

## 2022-11-30 RX ADMIN — SODIUM CHLORIDE: 9 INJECTION, SOLUTION INTRAVENOUS at 16:22

## 2022-11-30 RX ADMIN — ALBUMIN (HUMAN): 12.5 INJECTION, SOLUTION INTRAVENOUS at 12:12

## 2022-11-30 RX ADMIN — DOCUSATE SODIUM 100 MG: 100 CAPSULE, LIQUID FILLED ORAL at 22:39

## 2022-11-30 RX ADMIN — ROCURONIUM BROMIDE 20 MG: 50 INJECTION INTRAVENOUS at 16:04

## 2022-11-30 RX ADMIN — PROPOFOL 20 MG: 10 INJECTION, EMULSION INTRAVENOUS at 20:48

## 2022-11-30 RX ADMIN — PROPOFOL 150 MG: 10 INJECTION, EMULSION INTRAVENOUS at 08:41

## 2022-11-30 RX ADMIN — SODIUM CHLORIDE 100 ML/HR: 0.9 INJECTION, SOLUTION INTRAVENOUS at 21:48

## 2022-11-30 RX ADMIN — EPHEDRINE SULFATE 5 MG: 50 INJECTION INTRAVENOUS at 19:55

## 2022-11-30 RX ADMIN — ALBUMIN (HUMAN): 12.5 INJECTION, SOLUTION INTRAVENOUS at 14:57

## 2022-11-30 RX ADMIN — ROCURONIUM BROMIDE 20 MG: 50 INJECTION INTRAVENOUS at 17:32

## 2022-11-30 RX ADMIN — GLYCOPYRROLATE 0.2 MG: 0.2 INJECTION, SOLUTION INTRAMUSCULAR; INTRAVENOUS at 11:03

## 2022-11-30 RX ADMIN — EPHEDRINE SULFATE 5 MG: 50 INJECTION INTRAVENOUS at 13:40

## 2022-11-30 RX ADMIN — EPHEDRINE SULFATE 5 MG: 50 INJECTION INTRAVENOUS at 17:09

## 2022-11-30 RX ADMIN — SODIUM CHLORIDE: 9 INJECTION, SOLUTION INTRAVENOUS at 08:44

## 2022-11-30 RX ADMIN — MIDAZOLAM 2 MG: 1 INJECTION INTRAMUSCULAR; INTRAVENOUS at 08:32

## 2022-11-30 RX ADMIN — FENTANYL CITRATE 50 MCG: 50 INJECTION INTRAMUSCULAR; INTRAVENOUS at 09:35

## 2022-11-30 RX ADMIN — ROCURONIUM BROMIDE 20 MG: 50 INJECTION INTRAVENOUS at 09:50

## 2022-11-30 RX ADMIN — HYDROMORPHONE HYDROCHLORIDE 0.6 MG: 2 INJECTION INTRAMUSCULAR; INTRAVENOUS; SUBCUTANEOUS at 09:57

## 2022-11-30 RX ADMIN — SUGAMMADEX 200 MG: 100 INJECTION, SOLUTION INTRAVENOUS at 21:03

## 2022-11-30 RX ADMIN — EPHEDRINE SULFATE 5 MG: 50 INJECTION INTRAVENOUS at 18:22

## 2022-11-30 RX ADMIN — SODIUM CHLORIDE, SODIUM LACTATE, POTASSIUM CHLORIDE, AND CALCIUM CHLORIDE: .6; .31; .03; .02 INJECTION, SOLUTION INTRAVENOUS at 12:37

## 2022-11-30 RX ADMIN — ROCURONIUM BROMIDE 10 MG: 50 INJECTION INTRAVENOUS at 20:02

## 2022-11-30 RX ADMIN — SODIUM CHLORIDE 0.1 MCG/KG/HR: 9 INJECTION, SOLUTION INTRAVENOUS at 09:12

## 2022-11-30 RX ADMIN — SODIUM CHLORIDE: 9 INJECTION, SOLUTION INTRAVENOUS at 14:35

## 2022-11-30 RX ADMIN — GABAPENTIN 300 MG: 300 CAPSULE ORAL at 22:38

## 2022-11-30 RX ADMIN — ROCURONIUM BROMIDE 20 MG: 50 INJECTION INTRAVENOUS at 14:01

## 2022-11-30 RX ADMIN — ALBUMIN (HUMAN): 12.5 INJECTION, SOLUTION INTRAVENOUS at 19:05

## 2022-11-30 RX ADMIN — FENTANYL CITRATE 100 MCG: 50 INJECTION INTRAMUSCULAR; INTRAVENOUS at 08:41

## 2022-11-30 RX ADMIN — ACETAMINOPHEN 975 MG: 325 TABLET ORAL at 23:45

## 2022-11-30 RX ADMIN — INDOCYANINE GREEN AND WATER 7.5 MG: KIT at 19:52

## 2022-11-30 RX ADMIN — ROCURONIUM BROMIDE 20 MG: 50 INJECTION INTRAVENOUS at 14:50

## 2022-11-30 RX ADMIN — GLYCOPYRROLATE 0.2 MG: 0.2 INJECTION, SOLUTION INTRAMUSCULAR; INTRAVENOUS at 08:50

## 2022-11-30 RX ADMIN — FENTANYL CITRATE 50 MCG: 50 INJECTION INTRAMUSCULAR; INTRAVENOUS at 09:54

## 2022-11-30 RX ADMIN — INDOCYANINE GREEN AND WATER 7.5 MG: KIT at 17:22

## 2022-11-30 RX ADMIN — LIDOCAINE HYDROCHLORIDE 100 MG: 20 INJECTION, SOLUTION EPIDURAL; INFILTRATION; INTRACAUDAL at 08:41

## 2022-11-30 RX ADMIN — ACETAMINOPHEN 975 MG: 325 TABLET ORAL at 07:27

## 2022-11-30 NOTE — PROGRESS NOTES
Patient marked in pre-operative holding area  Reviewed markings with patient and the planned reconstruction  All her questions were answered to her satisfaction, will proceed to OR as scheduled

## 2022-11-30 NOTE — ANESTHESIA PROCEDURE NOTES
Arterial Line Insertion  Performed by: Tamie Simons MD  Authorized by: Tamie Simons MD   Consent: Verbal consent obtained  Risks and benefits: risks, benefits and alternatives were discussed  Consent given by: patient  Patient understanding: patient states understanding of the procedure being performed  Patient consent: the patient's understanding of the procedure matches consent given  Procedure consent: procedure consent matches procedure scheduled  Relevant documents: relevant documents present and verified  Test results: test results available and properly labeled  Site marked: the operative site was marked  Radiology Images: Radiology Images displayed and confirmed  If images not available, report reviewed  Required items: required blood products, implants, devices, and special equipment available  Patient identity confirmed: verbally with patient and arm band  Preparation: Patient was prepped and draped in the usual sterile fashion    Indications: hemodynamic monitoring  Orientation:  Right  Location: radial artery  Sedation:  Patient sedated: yes  Analgesia: see MAR for details    Procedure Details:  Needle gauge: 20  Number of attempts: 2    Post-procedure:  Post-procedure: dressing applied  Waveform: good waveform  Post-procedure CNS: normal  Patient tolerance: Patient tolerated the procedure well with no immediate complications  Comments: Ultrasound guided

## 2022-11-30 NOTE — ANESTHESIA PREPROCEDURE EVALUATION
Procedure:  RECONSTRUCTION BREAST W/FLAP FREE DEEP INFERIOR EPIGASTRIC  (LIVE) (Bilateral: Breast)    Relevant Problems   ANESTHESIA (within normal limits)      CARDIO (within normal limits)      ENDO (within normal limits)      GI/HEPATIC   (+) GERD (gastroesophageal reflux disease)      /RENAL   (+) Chronic kidney disease, stage 3a (HCC)      GYN   (+) Breast cancer (HCC)      HEMATOLOGY (within normal limits)      MUSCULOSKELETAL (within normal limits)      NEURO/PSYCH (within normal limits)      PULMONARY (within normal limits)      Other   (+) Admission for breast reconstruction following mastectomy   (+) CPAP (continuous positive airway pressure) dependence   (+) Obesity   (+) Prediabetes        Physical Exam    Airway    Mallampati score: II  TM Distance: >3 FB  Neck ROM: full     Dental   No notable dental hx     Cardiovascular  Rhythm: regular, Rate: normal, Cardiovascular exam normal    Pulmonary  Pulmonary exam normal Breath sounds clear to auscultation,     Other Findings        Anesthesia Plan  ASA Score- 3     Anesthesia Type- general with ASA Monitors  Additional Monitors: arterial line  Airway Plan: ETT  Plan Factors-Exercise tolerance (METS): >4 METS  Chart reviewed  Existing labs reviewed  Patient summary reviewed  Induction- intravenous  Postoperative Plan- Plan for postoperative opioid use  Informed Consent- Anesthetic plan and risks discussed with patient  I personally reviewed this patient with the CRNA  Discussed and agreed on the Anesthesia Plan with the CRNA  Anabella Pascal           Recent labs personally reviewed:  Lab Results   Component Value Date    WBC 7 72 10/07/2022    HGB 13 8 10/07/2022     10/07/2022     Lab Results   Component Value Date    K 3 8 10/07/2022    BUN 11 10/07/2022    CREATININE 1 27 10/07/2022     Lab Results   Component Value Date    HGBA1C 5 7 (H) 08/02/2022       IKaryna MD, have personally seen and evaluated the patient prior to anesthetic care  I have reviewed the pre-anesthetic record, and other medical records if appropriate to the anesthetic care  If a CRNA is involved in the case, I have reviewed the CRNA assessment, if present, and agree  Risks/benefits and alternatives discussed with patient including possible PONV, sore throat, and possibility of rare anesthetic and surgical emergencies

## 2022-11-30 NOTE — H&P
Plastic Surgery Pre-Operative H&P:      Subjective:    Patient ID: Jessica Greene is a 72 y o  female  HPI Patient is a 81 year old female who presents today for a preoperative history and physical  She is being seen for evaluation of bilateral breast reconstruction with LIVE flaps  Pt is S/P first stage breast reconstruction with placement of bilateral Allergan Milan [id number] Tissue expanders, reinforcement of bilateral inframammary fold with large alloderm contour acellular dermal matrix, placement of bilateral LEONARDO non dme negative pressure wound therapy dressings, each less than 50cmsq  Expanders are filled to 800 mL bilaterally  She finished chemo on March 12, 2021  No radiation needed  Patient's past medical history is significant for prediabetes, RLS, cerebrovascular disease, GERD, stage 3 chronic kidney disease, sleep apnea and obesity  The patient does not take any anticoagulants, beta-blockers, or steroids  The patient is a nonsmoker  Patient denies having any medication allergies  She denies any pain, breast redness, breast swelling, n/v/d, fever, chills, SOB, weight changes, fatigue or other issues at this time  The patient does report that she has a family history of thrombocytopenia, but she does not have this  Other abdominal surgeries include laparoscopic hysterectomy  She is UTD on EKG and labs  Patient is compliant  No questions or concerns   Messi Franco is scheduled for surgery on 11/30           Medical History   No past medical history on file          There is no problem list on file for this patient         Current Outpatient Medications:   •  benzonatate (TESSALON PERLES) 100 mg capsule, , Disp: , Rfl:   •  Biotin 5 MG TABS, Take 5,000 mcg by mouth daily, Disp: , Rfl:   •  Cholecalciferol 25 MCG (1000 UT) capsule, Take 1,000 Units by mouth daily, Disp: , Rfl:   •  citalopram (CeleXA) 20 mg tablet, Take 20 mg by mouth daily, Disp: , Rfl:   •  Doxepin HCl 6 MG TABS, Take one prior to bedtime to help with insomnia, Disp: , Rfl:   •  Elastic Bandages & Supports (B-3 Extra High Comp Hose Women) MISC, Use, Disp: , Rfl:   •  fluticasone (FLONASE) 50 mcg/act nasal spray, 1 spray into each nostril daily, Disp: , Rfl:   •  gabapentin (NEURONTIN) 100 mg capsule, , Disp: , Rfl:   •  Multiple Vitamin (Multi Vitamin Daily) TABS, daily, Disp: , Rfl:   •  omeprazole (PriLOSEC) 20 mg delayed release capsule, Take 20 mg by mouth daily, Disp: , Rfl:      Surgical History   No past surgical history on file          Social History                Tobacco Use   • Smoking status: Never Smoker   • Smokeless tobacco: Never Used   Substance Use Topics   • Alcohol use: Yes       Comment: occasionally         Review of Systems    Per HPI        Objective:  Vitals:    11/30/22 0646   BP: 161/81   Pulse: 63   Resp: 16   Temp: (!) 96 5 °F (35 8 °C)   SpO2: 94%            Physical Exam       General: Well-developed, well-nourished 81year old female in no acute distress  HEENT: Head is normocephalic and atraumatic  EOMI  Mouth pink and moist   Cardiovascular: Heart is regular  Respiratory: Normal respiratory effort  Breasts: Symmetric, no skin changes, no dominant masses palpable, no supraclavicular, infraclavicular or axillary lymphadenopathy noted  Incisions are C/D/I with no evidence of surrounding erythema, drainage, skin necrosis, hematoma, seroma, or wound dehiscence  Nipples are absent  The flaps are viable  No signs of infection  Good tissue expander placement bilaterally  Healed well  Abdomen: Soft, non-distended, nontender  Bowel sounds are present in all 4 quadrants  There are no masses or organomegaly noted  Extremities: No edema or gross deformities  Skin: Warm, dry, without rashes or cyanosis    Neuro: Alert and oriented x 3         Assessment/Plan: 81year-old female status post bilateral breast reconstruction present for preoperative history and physical for bilateral LIVE free flap reconstruction           Patient is scheduled for surgery on 11/30/2022  Markus Reece is aware to go for preoperative chest x-ray   CTA, EKG, and labs reviewed   Postop appointment scheduled  Ellen Haynes surgery was discussed in detail with the patient including the risks of bleeding, infection, scarring, asymmetry, partial or full flap failure, skin necrosis, umbilical necrosis, DVT, wound healing complications, hematoma, seroma, and need for additional procedures   Also discussed was the expected surgical time, the expected hospital stay, the expected recovery time, the use of drains, and the use of kellie dressings   The patient understands and agrees to the plan, no additional questions at this time   Surgical consent signed            Marshall Oro PA-C  Plastic & Reconstructive Surgery

## 2022-12-01 LAB
ANION GAP SERPL CALCULATED.3IONS-SCNC: 6 MMOL/L (ref 4–13)
BUN SERPL-MCNC: 10 MG/DL (ref 5–25)
CALCIUM SERPL-MCNC: 7.3 MG/DL (ref 8.3–10.1)
CHLORIDE SERPL-SCNC: 110 MMOL/L (ref 96–108)
CO2 SERPL-SCNC: 24 MMOL/L (ref 21–32)
CREAT SERPL-MCNC: 1.1 MG/DL (ref 0.6–1.3)
ERYTHROCYTE [DISTWIDTH] IN BLOOD BY AUTOMATED COUNT: 14.4 % (ref 11.6–15.1)
GFR SERPL CREATININE-BSD FRML MDRD: 50 ML/MIN/1.73SQ M
GLUCOSE SERPL-MCNC: 128 MG/DL (ref 65–140)
GLUCOSE SERPL-MCNC: 143 MG/DL (ref 65–140)
GLUCOSE SERPL-MCNC: 146 MG/DL (ref 65–140)
GLUCOSE SERPL-MCNC: 161 MG/DL (ref 65–140)
HCT VFR BLD AUTO: 29.7 % (ref 34.8–46.1)
HGB BLD-MCNC: 9.2 G/DL (ref 11.5–15.4)
LACTATE SERPL-SCNC: 2 MMOL/L (ref 0.5–2)
MCH RBC QN AUTO: 28.8 PG (ref 26.8–34.3)
MCHC RBC AUTO-ENTMCNC: 31 G/DL (ref 31.4–37.4)
MCV RBC AUTO: 93 FL (ref 82–98)
PLATELET # BLD AUTO: 128 THOUSANDS/UL (ref 149–390)
PMV BLD AUTO: 11.7 FL (ref 8.9–12.7)
POTASSIUM SERPL-SCNC: 3.8 MMOL/L (ref 3.5–5.3)
RBC # BLD AUTO: 3.2 MILLION/UL (ref 3.81–5.12)
SODIUM SERPL-SCNC: 140 MMOL/L (ref 135–147)
WBC # BLD AUTO: 14.43 THOUSAND/UL (ref 4.31–10.16)

## 2022-12-01 RX ORDER — SODIUM CHLORIDE, SODIUM GLUCONATE, SODIUM ACETATE, POTASSIUM CHLORIDE, MAGNESIUM CHLORIDE, SODIUM PHOSPHATE, DIBASIC, AND POTASSIUM PHOSPHATE .53; .5; .37; .037; .03; .012; .00082 G/100ML; G/100ML; G/100ML; G/100ML; G/100ML; G/100ML; G/100ML
1000 INJECTION, SOLUTION INTRAVENOUS ONCE
Status: DISCONTINUED | OUTPATIENT
Start: 2022-12-01 | End: 2022-12-01

## 2022-12-01 RX ORDER — POTASSIUM CHLORIDE 20 MEQ/1
40 TABLET, EXTENDED RELEASE ORAL ONCE
Status: COMPLETED | OUTPATIENT
Start: 2022-12-01 | End: 2022-12-01

## 2022-12-01 RX ORDER — INSULIN LISPRO 100 [IU]/ML
1-6 INJECTION, SOLUTION INTRAVENOUS; SUBCUTANEOUS
Status: DISCONTINUED | OUTPATIENT
Start: 2022-12-01 | End: 2022-12-04 | Stop reason: HOSPADM

## 2022-12-01 RX ORDER — INSULIN LISPRO 100 [IU]/ML
1-6 INJECTION, SOLUTION INTRAVENOUS; SUBCUTANEOUS EVERY 6 HOURS SCHEDULED
Status: DISCONTINUED | OUTPATIENT
Start: 2022-12-01 | End: 2022-12-01

## 2022-12-01 RX ORDER — SODIUM CHLORIDE, SODIUM GLUCONATE, SODIUM ACETATE, POTASSIUM CHLORIDE, MAGNESIUM CHLORIDE, SODIUM PHOSPHATE, DIBASIC, AND POTASSIUM PHOSPHATE .53; .5; .37; .037; .03; .012; .00082 G/100ML; G/100ML; G/100ML; G/100ML; G/100ML; G/100ML; G/100ML
1000 INJECTION, SOLUTION INTRAVENOUS ONCE
Status: COMPLETED | OUTPATIENT
Start: 2022-12-01 | End: 2022-12-01

## 2022-12-01 RX ORDER — SODIUM CHLORIDE, SODIUM GLUCONATE, SODIUM ACETATE, POTASSIUM CHLORIDE, MAGNESIUM CHLORIDE, SODIUM PHOSPHATE, DIBASIC, AND POTASSIUM PHOSPHATE .53; .5; .37; .037; .03; .012; .00082 G/100ML; G/100ML; G/100ML; G/100ML; G/100ML; G/100ML; G/100ML
500 INJECTION, SOLUTION INTRAVENOUS ONCE
Status: COMPLETED | OUTPATIENT
Start: 2022-12-01 | End: 2022-12-01

## 2022-12-01 RX ADMIN — OXYCODONE HYDROCHLORIDE 2.5 MG: 5 TABLET ORAL at 02:52

## 2022-12-01 RX ADMIN — SODIUM CHLORIDE 100 ML/HR: 0.9 INJECTION, SOLUTION INTRAVENOUS at 08:35

## 2022-12-01 RX ADMIN — ACETAMINOPHEN 975 MG: 325 TABLET ORAL at 06:18

## 2022-12-01 RX ADMIN — OXYCODONE HYDROCHLORIDE 2.5 MG: 5 TABLET ORAL at 13:27

## 2022-12-01 RX ADMIN — POTASSIUM CHLORIDE 40 MEQ: 1500 TABLET, EXTENDED RELEASE ORAL at 08:24

## 2022-12-01 RX ADMIN — SODIUM CHLORIDE, SODIUM GLUCONATE, SODIUM ACETATE, POTASSIUM CHLORIDE, MAGNESIUM CHLORIDE, SODIUM PHOSPHATE, DIBASIC, AND POTASSIUM PHOSPHATE 1000 ML: .53; .5; .37; .037; .03; .012; .00082 INJECTION, SOLUTION INTRAVENOUS at 13:45

## 2022-12-01 RX ADMIN — GABAPENTIN 300 MG: 300 CAPSULE ORAL at 16:37

## 2022-12-01 RX ADMIN — GABAPENTIN 300 MG: 300 CAPSULE ORAL at 08:24

## 2022-12-01 RX ADMIN — GABAPENTIN 300 MG: 300 CAPSULE ORAL at 21:22

## 2022-12-01 RX ADMIN — ACETAMINOPHEN 975 MG: 325 TABLET ORAL at 17:36

## 2022-12-01 RX ADMIN — SODIUM CHLORIDE, SODIUM GLUCONATE, SODIUM ACETATE, POTASSIUM CHLORIDE AND MAGNESIUM CHLORIDE 500 ML: 526; 502; 368; 37; 30 INJECTION, SOLUTION INTRAVENOUS at 21:36

## 2022-12-01 RX ADMIN — CEFAZOLIN SODIUM 2000 MG: 2 SOLUTION INTRAVENOUS at 04:30

## 2022-12-01 RX ADMIN — CITALOPRAM HYDROBROMIDE 20 MG: 20 TABLET ORAL at 08:24

## 2022-12-01 RX ADMIN — ACETAMINOPHEN 975 MG: 325 TABLET ORAL at 12:00

## 2022-12-01 RX ADMIN — PANTOPRAZOLE SODIUM 20 MG: 20 TABLET, DELAYED RELEASE ORAL at 06:19

## 2022-12-01 RX ADMIN — SODIUM CHLORIDE, SODIUM GLUCONATE, SODIUM ACETATE, POTASSIUM CHLORIDE, MAGNESIUM CHLORIDE, SODIUM PHOSPHATE, DIBASIC, AND POTASSIUM PHOSPHATE 1000 ML: .53; .5; .37; .037; .03; .012; .00082 INJECTION, SOLUTION INTRAVENOUS at 00:03

## 2022-12-01 RX ADMIN — DOCUSATE SODIUM 100 MG: 100 CAPSULE, LIQUID FILLED ORAL at 17:36

## 2022-12-01 RX ADMIN — ENOXAPARIN SODIUM 40 MG: 40 INJECTION SUBCUTANEOUS at 08:24

## 2022-12-01 RX ADMIN — CYCLOBENZAPRINE HYDROCHLORIDE 10 MG: 10 TABLET, FILM COATED ORAL at 21:22

## 2022-12-01 RX ADMIN — CEFAZOLIN SODIUM 2000 MG: 2 SOLUTION INTRAVENOUS at 21:23

## 2022-12-01 RX ADMIN — CYCLOBENZAPRINE HYDROCHLORIDE 10 MG: 10 TABLET, FILM COATED ORAL at 08:24

## 2022-12-01 RX ADMIN — CYCLOBENZAPRINE HYDROCHLORIDE 10 MG: 10 TABLET, FILM COATED ORAL at 16:37

## 2022-12-01 RX ADMIN — LABETALOL HYDROCHLORIDE 10 MG: 5 INJECTION, SOLUTION INTRAVENOUS at 00:03

## 2022-12-01 RX ADMIN — DOCUSATE SODIUM 100 MG: 100 CAPSULE, LIQUID FILLED ORAL at 08:24

## 2022-12-01 RX ADMIN — ASPIRIN 325 MG ORAL TABLET 325 MG: 325 PILL ORAL at 01:33

## 2022-12-01 RX ADMIN — CEFAZOLIN SODIUM 2000 MG: 2 SOLUTION INTRAVENOUS at 13:30

## 2022-12-01 RX ADMIN — ASPIRIN 325 MG ORAL TABLET 325 MG: 325 PILL ORAL at 08:27

## 2022-12-01 NOTE — PROGRESS NOTES
Daily Progress Note - Critical Care   Venita Garcia 81 y o  female MRN: [id number]  Unit/Bed#: [facility] 517-01 Encounter: [id number]        ----------------------------------------------------------------------------------------  HPI/24hr events: 81 y o  female with past medical history of depression, GERD, CVA, CKD 3, SARIKA, obesity, and breast cancer who now presents status post bilateral LIVE flap  Postop lactic acidosis, bolused 1 L IV fluid with improvement  Right breast flap monitor malfunctioning Dr Izabela Parsnos contacted overnight, disconnected continual monitoring, Q 1 spot checks     ---------------------------------------------------------------------------------------  SUBJECTIVE    Review of Systems   Constitutional: Positive for fatigue  Gastrointestinal: Positive for abdominal distention and abdominal pain  Review of systems was reviewed and negative unless stated above in HPI/24-hour events   ---------------------------------------------------------------------------------------  Assessment and Plan:    Neuro:   • Analgesia:  Around the clock Tylenol, Flexeril t i d , gabapentin 300 t i d   o Oxycodone 2 5 p r n   o Oxycodone 5 p r n   o Dilaudid 0 5 for breakthrough  • Sedation:  NA  • Delirium PPX: CAM-ICU, Sleep Hygiene   • History of depression:  Continue home Celexa  o Doxepin non formulary patient's  to bring in today      CV:   • Hypertension:  P r n   Labetalol      Pulm:  • SARIKA not on CPAP  • Postoperative pulmonary insufficiency requiring 2 L by nasal cannula:  Likely secondary to prolonged OR time and above  o Wean nasal cannula as tolerated  o Incentive spirometry      GI:   • NPO sips with meds advance diet as tolerated  • At risk for ileus  • Bowel regimen  • GI prophylaxis:  Protonix      :   • CKD 3:  Creatinine at baseline  • Consider discontinuing Fish today      F/E/N:   • Saline 100cc/hr      Heme/Onc:   • Acute blood loss anemia:  Secondary to postoperative losses trend daily  • DVT PPX:  Lovenox, SCDs  o May need to consider heparin with baseline CKD      Endo:   • Pre diabetes:  Continue Q 6 acute check  o Goal blood glucose 140-180 for optimal wound healing postoperatively      ID:   • Received perioperative biotics      MSK/Skin:   • PT/OT when appropriate will need to be cleared for mobilization by primary team      Disposition: Continue Critical Care   Code Status: Level 1 - Full Code  ---------------------------------------------------------------------------------------  ICU CORE MEASURES    Prophylaxis   VTE Pharmacologic Prophylaxis: Enoxaparin (Lovenox)  VTE Mechanical Prophylaxis: sequential compression device  Stress Ulcer Prophylaxis: Prophylaxis Not Indicated       Invasive Devices Review  Invasive Devices     Peripheral Intravenous Line  Duration           Peripheral IV 11/30/22 Right Hand <1 day    Peripheral IV 11/30/22 Right Wrist <1 day    Peripheral IV 12/01/22 Right Antecubital <1 day          Arterial Line  Duration           Arterial Line 11/30/22 Right Radial <1 day          Drain  Duration           Closed/Suction Drain Inferior; Lateral;Right Breast Bulb 15 Fr  <1 day    Closed/Suction Drain Lateral RLQ Bulb 19 Fr  <1 day    Closed/Suction Drain Lateral;Left Breast Bulb 15 Fr  <1 day    Closed/Suction Drain Lateral;Left LLQ Bulb 19 Fr  <1 day    NG/OG/Enteral Tube Orogastric 18 Fr Center mouth <1 day    Urethral Catheter Latex; Temperature probe 16 Fr  <1 day              Can any invasive devices be discontinued today? Arterial line d/c  ---------------------------------------------------------------------------------------  OBJECTIVE    Physical Exam  Constitutional:       Appearance: She is obese  HENT:      Head: Normocephalic  Mouth/Throat:      Mouth: Mucous membranes are moist    Eyes:      Pupils: Pupils are equal, round, and reactive to light  Cardiovascular:      Rate and Rhythm: Normal rate and regular rhythm     Pulmonary: Effort: Pulmonary effort is normal       Breath sounds: Normal breath sounds  Abdominal:      General: There is distension  Palpations: Abdomen is soft  Tenderness: There is abdominal tenderness  Musculoskeletal:         General: Normal range of motion  Cervical back: Normal range of motion and neck supple  Skin:     General: Skin is warm  Capillary Refill: Capillary refill takes less than 2 seconds  Neurological:      General: No focal deficit present  Mental Status: She is alert  Vitals   Vitals:    22 0030 22 0100 22 0115 22 0130   BP:       Pulse: 77 80 81 76   Resp: 17 17 18 17   Temp: 100 °F (37 8 °C) 99 9 °F (37 7 °C) 99 9 °F (37 7 °C) 99 9 °F (37 7 °C)   TempSrc:       SpO2: 98% 98% 97% 97%   Weight:       Height:         Temp (24hrs), Av 5 °F (37 5 °C), Min:96 5 °F (35 8 °C), Max:100 °F (37 8 °C)  Current: Temperature: 99 9 °F (37 7 °C)      Invasive/non-invasive ventilation settings   Respiratory    Lab Data (Last 4 hours)    None         O2/Vent Data (Last 4 hours)    None                Height and Weights   Height: 5' 3" (160 cm)     Body mass index is 33 66 kg/m²  Weight (last 2 days)     Date/Time Weight    22 0646 86 2 (190)            Intake and Output  I/O        0701   0700  0701   0700    I V  (mL/kg)  5320 (61 7)    IV Piggyback  820    Total Intake(mL/kg)  6140 (71 2)    Urine (mL/kg/hr)  1545 (0 7)    Drains  60    Blood  350    Total Output  1955    Net  +4185                  Nutrition       Diet Orders   (From admission, onward)             Start     Ordered    22  Diet NPO; Sips with meds  Diet effective now        References:    Nutrtion Support Algorithm Enteral vs  Parenteral   Question Answer Comment   Diet Type NPO    NPO Except: Sips with meds    RD to adjust diet per protocol?  No        22                Laboratory and Diagnostics:  Results from last 7 days   Lab Units 11/30/22 2220 11/30/22  1901 11/30/22  1701 11/30/22  1456 11/30/22  1207   WBC Thousand/uL 13 73*  --   --   --   --    HEMOGLOBIN g/dL 9 8*  --   --   --   --    I STAT HEMOGLOBIN g/dl  --  10 2* 10 2* 10 5* 12 2   HEMATOCRIT % 31 1*  --   --   --   --    HEMATOCRIT, ISTAT %  --  30* 30* 31* 36   PLATELETS Thousands/uL 127*  --   --   --   --      Results from last 7 days   Lab Units 11/30/22 2220 11/30/22  1901 11/30/22  1701 11/30/22  1456 11/30/22  1207   SODIUM mmol/L 138  --   --   --   --    POTASSIUM mmol/L 4 1  --   --   --   --    CHLORIDE mmol/L 109*  --   --   --   --    CO2 mmol/L 21  --   --   --   --    CO2, I-STAT mmol/L  --  23 24 24 25   ANION GAP mmol/L 8  --   --   --   --    BUN mg/dL 10  --   --   --   --    CREATININE mg/dL 1 35*  --   --   --   --    CALCIUM mg/dL 7 7*  --   --   --   --    GLUCOSE RANDOM mg/dL 192*  --   --   --   --      Results from last 7 days   Lab Units 11/30/22  2220   MAGNESIUM mg/dL 2 0   PHOSPHORUS mg/dL 3 2               Results from last 7 days   Lab Units 12/01/22  0136 11/30/22  2220   LACTIC ACID mmol/L 2 0 3 9*     ABG:  Results from last 7 days   Lab Units 11/30/22  2219   PH ART  7 345*   PCO2 ART mm Hg 36 8   PO2 ART mm Hg 101 1   HCO3 ART mmol/L 19 6*   BASE EXC ART mmol/L -5 5   ABG SOURCE  Line, Arterial     VBG:  Results from last 7 days   Lab Units 11/30/22 2219   ABG SOURCE  Line, Arterial           Micro            Active Medications  Scheduled Meds:  Current Facility-Administered Medications   Medication Dose Route Frequency Provider Last Rate   • acetaminophen  975 mg Oral Q6H [facility] Michael Lee PA-C     • aspirin  325 mg Oral Daily Braxton Sharpe PA-C     • cefazolin  2,000 mg Intravenous Q8H Braxton Sharpe PA-C Stopped (11/30/22 6856)   • citalopram  20 mg Oral QAM Braxton Sharpe PA-C     • cyclobenzaprine  10 mg Oral TID Braxton Sharpe PA-C     • diphenhydrAMINE  25 mg Intravenous Q6H PRN Eren Gruber PA-C     • docusate sodium  100 mg Oral BID Eren Gruber PA-C     • Doxepin HCl  6 mg Oral HS Eren Gruber PA-C     • enoxaparin  40 mg Subcutaneous Daily Eren Gruber PA-C     • gabapentin  300 mg Oral TID Eren Gruber PA-C     • HYDROmorphone  0 5 mg Intravenous Q4H PRN Eren Gruber PA-C     • labetalol  10 mg Intravenous Q6H PRN Darek Aguilera PA-C     • ondansetron  4 mg Intravenous Q6H PRN Eren Gruber PA-C     • oxyCODONE  2 5 mg Oral Q4H PRN Darek Aguilera PA-C      Or   • oxyCODONE  5 mg Oral Q4H PRN Darek Aguilera PA-C     • pantoprazole  20 mg Oral Early Morning Eren Gruber PA-C     • sodium chloride  100 mL/hr Intravenous Continuous Eren Gruber PA-C 100 mL/hr (11/30/22 2148)     Continuous Infusions:  sodium chloride, 100 mL/hr, Last Rate: 100 mL/hr (11/30/22 2148)      PRN Meds:   diphenhydrAMINE, 25 mg, Q6H PRN  HYDROmorphone, 0 5 mg, Q4H PRN  labetalol, 10 mg, Q6H PRN  ondansetron, 4 mg, Q6H PRN  oxyCODONE, 2 5 mg, Q4H PRN   Or  oxyCODONE, 5 mg, Q4H PRN        Allergies   Allergies   Allergen Reactions   • Pollen Extract Other (See Comments)       Advance Directive and Living Will:      Power of :    POLST:        Counseling / Coordination of Care  Total Critical Care time spent 0 minutes excluding procedures, teaching and family updates  Darek Aguilera PA-C        Portions of the record may have been created with voice recognition software  Occasional wrong word or "sound a like" substitutions may have occurred due to the inherent limitations of voice recognition software    Read the chart carefully and recognize, using context, where substitutions have occurred

## 2022-12-01 NOTE — UTILIZATION REVIEW
Initial Clinical Review    Elective Inpatient surgical procedure  Age/Sex: 81 y o  female  Surgery Date: 11/30/2022  Procedure: Bilateral RECONSTRUCTION BREAST W/FLAP FREE DEEP INFERIOR EPIGASTRIC  (LIVE)  Anesthesia: General    POD#1 Progress Note: Pt s/p bilateral LIVE flap on 11/30, transferred to ICU s/p OR procedure for close monitoring  She had lactic acidosis post op given 1L IVF bolus w/ improvement  Overnight, Right breast flap monitor malfunctioning, disconnected continual monitoring, Q 1 h spot checks, plastics attending contacted  On 2L NC post op for pulmonary insufficiency, wean as arie  IS, Pain control prn  NPO, advance diet as arie  PPI  Cont IVF  DVT ppx  Cont BG q6 check, goal 140-180  PT/OT  Admission Orders: Date/Time/Statement:   Admission Orders (From admission, onward)     Ordered        11/30/22 1402  Inpatient Admission  Once            Signed and Held  Inpatient Admission  Once                      Orders Placed This Encounter   Procedures   • Inpatient Admission     Standing Status:   Standing     Number of Occurrences:   1     Order Specific Question:   Level of Care     Answer:   Critical Care [15]     Order Specific Question:   Bed request comments     Answer:   Bilateral LIVE free flap monitoring     Order Specific Question:   Estimated length of stay     Answer:   More than 2 Midnights     Order Specific Question:   Certification     Answer:   I certify that inpatient services are medically necessary for this patient for a duration of greater than two midnights  See H&P and MD Progress Notes for additional information about the patient's course of treatment       Vital Signs: BP (!) 171/74   Pulse 67   Temp 99 7 °F (37 6 °C)   Resp 17   Ht 5' 3" (1 6 m)   Wt 86 2 kg (190 lb)   SpO2 96%   BMI 33 66 kg/m²     Pertinent Labs/Diagnostic Test Results:   No orders to display         Results from last 7 days   Lab Units 12/01/22  0424 11/30/22  2220 11/30/22  1901 11/30/22  1701 11/30/22  1456   WBC Thousand/uL 14 43* 13 73*  --   --   --    HEMOGLOBIN g/dL 9 2* 9 8*  --   --   --    I STAT HEMOGLOBIN g/dl  --   --  10 2* 10 2* 10 5*   HEMATOCRIT % 29 7* 31 1*  --   --   --    HEMATOCRIT, ISTAT %  --   --  30* 30* 31*   PLATELETS Thousands/uL 128* 127*  --   --   --          Results from last 7 days   Lab Units 12/01/22  0424 11/30/22  2220 11/30/22  1901 11/30/22  1701 11/30/22  1456 11/30/22  1207   SODIUM mmol/L 140 138  --   --   --   --    POTASSIUM mmol/L 3 8 4 1  --   --   --   --    CHLORIDE mmol/L 110* 109*  --   --   --   --    CO2 mmol/L 24 21  --   --   --   --    CO2, I-STAT mmol/L  --   --  23 24 24 25   ANION GAP mmol/L 6 8  --   --   --   --    BUN mg/dL 10 10  --   --   --   --    CREATININE mg/dL 1 10 1 35*  --   --   --   --    EGFR ml/min/1 73sq m 50 39  --   --   --   --    CALCIUM mg/dL 7 3* 7 7*  --   --   --   --    CALCIUM, IONIZED, ISTAT mmol/L  --   --  1 06* 1 07* 1 09* 1 11*   MAGNESIUM mg/dL  --  2 0  --   --   --   --    PHOSPHORUS mg/dL  --  3 2  --   --   --   --          Results from last 7 days   Lab Units 12/01/22  0628   POC GLUCOSE mg/dl 146*     Results from last 7 days   Lab Units 12/01/22 0424 11/30/22  2220   GLUCOSE RANDOM mg/dL 161* 192*     Results from last 7 days   Lab Units 11/30/22  2219   PH ART  7 345*   PCO2 ART mm Hg 36 8   PO2 ART mm Hg 101 1   HCO3 ART mmol/L 19 6*   BASE EXC ART mmol/L -5 5   O2 CONTENT ART mL/dL 14 7*   O2 HGB, ARTERIAL % 96 5   ABG SOURCE  Line, Arterial         Results from last 7 days   Lab Units 11/30/22  1901 11/30/22  1701 11/30/22  1456 11/30/22  1207   PH, RADHA I-STAT  7 402* 7 379  --  7 386   PCO2, RADHA ISTAT mm HG 34 8* 38 9*  --  39 8*   PO2, RADHA ISTAT mm  0* 118 0*  --  127 0*   HCO3, RADHA ISTAT mmol/L 21 6* 23 0*  --  23 8*   I STAT BASE EXC mmol/L -3* -2 -2 -1   I STAT O2 SAT % 98* 99* 99* 99*   ISTAT PH ART   --   --  7 361  --    I STAT ART PCO2 mm HG  --   --  40 6  --    I STAT ART PO2 mm HG  --   --  136 0*  --    I STAT ART HCO3 mmol/L  --   --  23 0  --        Results from last 7 days   Lab Units 12/01/22  0136 11/30/22  2220   LACTIC ACID mmol/L 2 0 3 9*     Diet: Regular house  Mobility: Up w/ assistance  DVT Prophylaxis: SCD, Enoxaparin    Medications/Pain Control:   Scheduled Medications:  acetaminophen, 975 mg, Oral, Q6H [address]  aspirin, 325 mg, Oral, Daily  cefazolin, 2,000 mg, Intravenous, Q8H  citalopram, 20 mg, Oral, QAM  cyclobenzaprine, 10 mg, Oral, TID  docusate sodium, 100 mg, Oral, BID  Doxepin HCl, 6 mg, Oral, HS  enoxaparin, 40 mg, Subcutaneous, Daily  gabapentin, 300 mg, Oral, TID  insulin lispro, 1-6 Units, Subcutaneous, Q6H RODRIGO  pantoprazole, 20 mg, Oral, Early Morning  multi-electrolyte (ISOLYTE-S PH 7 4) bolus 1,000 mL IV once    Continuous IV Infusions:  sodium chloride, 100 mL/hr, Intravenous, Continuous  lactated ringers infusion  Rate: 125 mL/hr Dose: 125 mL/hr  Freq: Continuous Route: IV  Indications of Use: IV Hydration  Last Dose: Stopped (11/30/22 2148)  Start: 11/30/22 0700 End: 11/30/22 2140    PRN Meds:  diphenhydrAMINE, 25 mg, Intravenous, Q6H PRN  HYDROmorphone, 0 5 mg, Intravenous, Q4H PRN  labetalol, 10 mg, Intravenous, Q6H PRN 12/01 x 1  ondansetron, 4 mg, Intravenous, Q6H PRN  oxyCODONE, 2 5 mg, Oral, Q4H PRN 12/01 x 1   Or  oxyCODONE, 5 mg, Oral, Q4H PRN        Network Utilization Review Department  ATTENTION: Please call with any questions or concerns to [phone] and carefully listen to the prompts so that you are directed to the right person  All voicemails are confidential   Philip Blue all requests for admission clinical reviews, approved or denied determinations and any other requests to dedicated fax number below belonging to the campus where the patient is receiving treatment   List of dedicated fax numbers for the Facilities:  [address] DENIALS (Administrative/Medical Necessity) [phone]   [address] [place] [address] (Maternity/NICU/Pediatrics) Domenica Rojas [address] Melissa Cage  [phone]   [address] [place] [address] [place] [address] Syd[facility] ReddyGeneva [facility] Hospitalarmando [facility] [address] [facility] [address] [facility] 134 815 [facility] [phone]

## 2022-12-01 NOTE — PLAN OF CARE
Problem: Prexisting or High Potential for Compromised Skin Integrity  Goal: Skin integrity is maintained or improved  Description: INTERVENTIONS:  - Identify patients at risk for skin breakdown  - Assess and monitor skin integrity  - Assess and monitor nutrition and hydration status  - Monitor labs   - Assess for incontinence   - Turn and reposition patient  - Assist with mobility/ambulation  - Relieve pressure over bony prominences  - Avoid friction and shearing  - Provide appropriate hygiene as needed including keeping skin clean and dry  - Evaluate need for skin moisturizer/barrier cream  - Collaborate with interdisciplinary team   - Patient/family teaching  - Consider wound care consult   Outcome: Progressing     Problem: MOBILITY - ADULT  Goal: Maintain or return to baseline ADL function  Description: INTERVENTIONS:  -  Assess patient's ability to carry out ADLs; assess patient's baseline for ADL function and identify physical deficits which impact ability to perform ADLs (bathing, care of mouth/teeth, toileting, grooming, dressing, etc )  - Assess/evaluate cause of self-care deficits   - Assess range of motion  - Assess patient's mobility; develop plan if impaired  - Assess patient's need for assistive devices and provide as appropriate  - Encourage maximum independence but intervene and supervise when necessary  - Involve family in performance of ADLs  - Assess for home care needs following discharge   - Consider OT consult to assist with ADL evaluation and planning for discharge  - Provide patient education as appropriate  Outcome: Progressing  Goal: Maintains/Returns to pre admission functional level  Description: INTERVENTIONS:  - Perform BMAT or MOVE assessment daily    - Set and communicate daily mobility goal to care team and patient/family/caregiver     - Collaborate with rehabilitation services on mobility goals if consulted  - Out of bed for toileting  - Record patient progress and toleration of activity level   Outcome: Progressing     Problem: Potential for Falls  Goal: Patient will remain free of falls  Description: INTERVENTIONS:  - Educate patient/family on patient safety including physical limitations  - Instruct patient to call for assistance with activity   - Consult OT/PT to assist with strengthening/mobility   - Keep Call bell within reach  - Keep bed low and locked with side rails adjusted as appropriate  - Keep care items and personal belongings within reach  - Initiate and maintain comfort rounds  - Make Fall Risk Sign visible to staff  - Consider moving patient to room near nurses station  Outcome: Progressing     Problem: RESPIRATORY - ADULT  Goal: Achieves optimal ventilation and oxygenation  Description: INTERVENTIONS:  - Assess for changes in respiratory status  - Assess for changes in mentation and behavior  - Position to facilitate oxygenation and minimize respiratory effort  - Oxygen administered by appropriate delivery if ordered  - Initiate smoking cessation education as indicated  - Encourage broncho-pulmonary hygiene including cough, deep breathe, Incentive Spirometry  - Assess the need for suctioning and aspirate as needed  - Assess and instruct to report SOB or any respiratory difficulty  - Respiratory Therapy support as indicated  Outcome: Progressing     Problem: SKIN/TISSUE INTEGRITY - ADULT  Goal: Skin Integrity remains intact(Skin Breakdown Prevention)  Description: Assess:  -Inspect skin when repositioning, toileting, and assisting with ADLS  -Assess extremities for adequate circulation and sensation     Bed Management:  -Have minimal linens on bed & keep smooth, unwrinkled  -Change linens as needed when moist or perspiring      Toileting:  -Offer bedside commode    Activity:  -Encourage activity and walks on unit  -Encourage or provide ROM exercises   -Use appropriate equipment to lift or move patient in bed    Skin Care:  -Avoid use of baby powder, tape, friction and shearing, hot water or constrictive clothing  -Do not massage red bony areas      Outcome: Progressing  Goal: Incision(s), wounds(s) or drain site(s) healing without S/S of infection  Description: INTERVENTIONS  - Assess and document dressing, incision, wound bed, drain sites and surrounding tissue  - Provide patient and family education  Outcome: Progressing  Goal: Pressure injury heals and does not worsen  Description: Interventions:  - Implement low air loss mattress or specialty surface (Criteria met)  - Apply silicone foam dressing  - Apply fecal or urinary incontinence containment device   - Utilize friction reducing device or surface for transfers   - Consider nutrition services referral as needed  Outcome: Progressing

## 2022-12-01 NOTE — ANESTHESIA POSTPROCEDURE EVALUATION
Post-Op Assessment Note    CV Status:  Stable    Pain management: adequate     Mental Status:  Alert and awake   Hydration Status:  Euvolemic   PONV Controlled:  Controlled   Airway Patency:  Patent      Post Op Vitals Reviewed: Yes      Staff: Anesthesiologist, CRNA   Comments: pt taken to ICU on portable monitor and face mask  endorsed to ICU team        No notable events documented      BP   158/67   Temp     Pulse  85   Resp   16   SpO2   97

## 2022-12-01 NOTE — UTILIZATION REVIEW
NOTIFICATION OF INPATIENT ADMISSION   AUTHORIZATION REQUEST   SERVICING FACILITY:   [facility]  Address: [address] [place] [address], [address] [place] [address]  Tax ID: [id number]  NPI: [id number] ATTENDING PROVIDER:  Attending Name and NPI#: Alejandrina Chang Md [[id number]]  Address: [address] [place] [address]  Phone: [phone]   ADMISSION INFORMATION:  [facility]  Place of Service Code: 21  Inpatient Admission Date/Time: 11/30/22  2:02 PM  Discharge Date/Time: No discharge date for patient encounter  Admitting Diagnosis Code/Description:  History of breast reconstruction [Z98 [id number]]     UTILIZATION REVIEW CONTACT:  Inga Salazar Utilization   Network Utilization Review Department  Phone: [phone]  Fax: [phone]  Email: [email or web address]  Contact for approvals/pending authorizations, clinical reviews, and discharge  PHYSICIAN ADVISORY SERVICES:  Medical Necessity Denial & [id number] Review  Phone: [phone]  Fax: [phone]  Email: [email or web address]

## 2022-12-01 NOTE — NURSING NOTE
Per Dr Katelyn Vega, ok to spot check SpO2 on right breast flap and to disconnect continuous monitoring  MD instructed this RN to notify him if there is any complete loss of SpO2 signal  Current signal on right breast 62% with a signal quality of 83%

## 2022-12-01 NOTE — RESPIRATORY THERAPY NOTE
RT Protocol Note  Jarret Palencia 81 y o  female MRN: [id number]  Unit/Bed#: [facility] 517-01 Encounter: [id number]    Assessment    Principal Problem:    S/P breast reconstruction, bilateral  Active Problems:    Chronic kidney disease, stage 3a ([place] [address] )    Prediabetes    Obesity    GERD (gastroesophageal reflux disease)    CPAP (continuous positive airway pressure) dependence    Admission for breast reconstruction following mastectomy    Breast cancer ([place] [address] )      Home Pulmonary Medications:  None       Past Medical History:   Diagnosis Date    Cancer ([place] [address] )     CPAP (continuous positive airway pressure) dependence     Depression     GERD (gastroesophageal reflux disease)     Restless leg syndrome     Sleep apnea      Social History     Socioeconomic History    Marital status: /Civil Union     Spouse name: None    Number of children: None    Years of education: None    Highest education level: None   Occupational History    None   Tobacco Use    Smoking status: Never    Smokeless tobacco: Never   Vaping Use    Vaping Use: Never used   Substance and Sexual Activity    Alcohol use: Yes     Comment: occasionally    Drug use: Never    Sexual activity: None   Other Topics Concern    None   Social History Narrative    None     Social Determinants of Health     Financial Resource Strain: Not on file   Food Insecurity: Not on file   Transportation Needs: Not on file   Physical Activity: Not on file   Stress: Not on file   Social Connections: Not on file   Intimate Partner Violence: Not on file   Housing Stability: Not on file       Subjective         Objective    Physical Exam:   Assessment Type: Assess only  General Appearance: Alert, Awake  Respiratory Pattern: Normal  Chest Assessment: Chest expansion symmetrical  Bilateral Breath Sounds: Diminished    Vitals:  Blood pressure 109/54, pulse 65, temperature 99 1 °F (37 3 °C), resp  rate 13, height 5' 3" (1 6 m), weight 86 2 kg (190 lb), SpO2 97 %      Results from last 7 days   Lab Units 11/30/22  2219   PH ART  7 345*   PCO2 ART mm Hg 36 8   PO2 ART mm Hg 101 1   HCO3 ART mmol/L 19 6*   BASE EXC ART mmol/L -5 5   O2 CONTENT ART mL/dL 14 7*   O2 HGB, ARTERIAL % 96 5   ABG SOURCE  Line, Arterial       Imaging and other studies: I have personally reviewed pertinent reports  Plan    Respiratory Plan: Discontinue Protocol        Resp Comments: (P) Pt evaluated per resp protocol  Pt has no asthma or COPD history, takes no home pulm meds and doesnt wear o2 at home  Pt in no resp distress, Will dc resp protocol   Pt has order for IS with nursing

## 2022-12-01 NOTE — NURSING NOTE
Contacted Dr Naima Adams regarding right flap SpO2 monitoring intermittently reading and alarming for weak signal  With repositioning and reinforcement SpO2 reads approximately 61%  All other vital signs stable awaiting response from Dr Langston

## 2022-12-01 NOTE — CONSULTS
Surgical Critical Care consult - Critical Care   Pk Perry 81 y o  female MRN: [id number]  Unit/Bed#: [facility] 517-01 Encounter: [id number]      -------------------------------------------------------------------------------------------------------------  Chief Complaint:  Status post bilateral LIVE flap    History of Present Illness   HX and PE limited by:  Nothing  Pk Perry is a 81 y o  female with past medical history of depression, GERD, CVA, CKD 3, SARIKA, obesity, and breast cancer who now presents status post bilateral LIVE flap  Patient finished chemotherapy for breast cancer in March of 2021, did not need radiation, was treated with bilateral mastectomy  She has been following with Dr Jaci Dodson in the Plastic surgery team, underwent first-stage breast reconstruction and now presents status post bilateral LIVE flap  Of note Doppler signals were not able to be obtained in the OR, following vioptix primarily  History obtained from chart review and the patient   -------------------------------------------------------------------------------------------------------------  Assessment and Plan:    Neuro:   •  Analgesia:  ATC Tylenol, Flexeril t i d  Gabapentin 300 t i d   o P r n  Oxycodone 2 5/5 for moderate and severe pain  o Dilaudid IV for breakthrough  • Sedation:  Na  • Delirium PPX: CAM-ICU, sleep hygiene   • History of depression:  Continue home Celexa   o Doxepin not on formulary, will ask patient's  to bring in        CV:   •  Hypertension:  Likely driven by pain  • Consider p r n  Labetalol for SBP greater than 160      Pulm:  • SARIKA: not on CPAP   • Postoperative pulmonary insufficiency requiring 2 L by nasal cannula:  Likely secondary to prolonged all were case with supine positioning  o On 4 L by nasal cannula  o Wean O2 as tolerated  o Continue incentive spirometry        GI:   •  NPO sips with meds  • At risk for ileus  • Bowel Reg:  Senna/Colace, p r n   MiraLax  • GI PPX: Protonix          :   • CKD 3:  Monitor renal function tests  • Maintain Fish catheter for postop requirements        F/E/N:   • Normal saline at 100 cc/hour  • Electrolytes - Monitor/trend - replete based on deficiencies       Heme/Onc:   •  Hemoglobin stable  • DVT PPX:  Lovenox, SCDs        Endo:   • Pre diabetes:  Continue q 6 Accu-Chek  o Goal blood glucose 140-180 for optimal wound healing postop          ID:   • Received genie op antibiotic        MSK/Skin:   •  PT/OT when appropriate will need to be cleared for mobilization by primary team        Disposition: Admit to Critical Care   Code Status: Level 1 - Full Code  --------------------------------------------------------------------------------------------------------------  Review of Systems   Constitutional: Positive for fatigue  Gastrointestinal: Positive for abdominal distention and abdominal pain  A 12-point, complete review of systems was reviewed and negative except as stated above     Physical Exam  Constitutional:       Appearance: She is obese  HENT:      Head: Normocephalic  Mouth/Throat:      Mouth: Mucous membranes are moist    Eyes:      Pupils: Pupils are equal, round, and reactive to light  Cardiovascular:      Rate and Rhythm: Normal rate and regular rhythm  Pulmonary:      Effort: Pulmonary effort is normal       Breath sounds: Normal breath sounds  Abdominal:      General: There is distension  Tenderness: There is abdominal tenderness (incisional)  Comments: Inferior abd incision to vac   Musculoskeletal:         General: Normal range of motion  Arms:       Cervical back: Normal range of motion  Right lower leg: Edema present  Left lower leg: Edema present  Skin:     General: Skin is warm  Capillary Refill: Capillary refill takes less than 2 seconds  Neurological:      General: No focal deficit present  Mental Status: She is alert  --------------------------------------------------------------------------------------------------------------  Historical Information   Past Medical History:   Diagnosis Date   • Cancer ([facility] [address] )    • CPAP (continuous positive airway pressure) dependence    • Depression    • GERD (gastroesophageal reflux disease)    • Restless leg syndrome    • Sleep apnea      Past Surgical History:   Procedure Laterality Date   • BILATERAL OOPHORECTOMY     • ELBOW FRACTURE SURGERY     • MASTECTOMY     • RHINOPLASTY     • SINUS SURGERY       Social History   Social History     Substance and Sexual Activity   Alcohol Use Yes    Comment: occasionally     Social History     Substance and Sexual Activity   Drug Use Never     Social History     Tobacco Use   Smoking Status Never   Smokeless Tobacco Never     Exercise History: Active  Family History:   History reviewed  No pertinent family history  I have reviewed this patient's family history and commented on sigificant items within the HPI    Vitals:   Vitals:    11/23/22 0900 11/30/22 0646   BP:  161/81   Pulse:  63   Resp:  16   Temp:  (!) 96 5 °F (35 8 °C)   TempSrc:  Temporal   SpO2:  94%   Weight: 86 2 kg (190 lb) 86 2 kg (190 lb)   Height: 5' 3" (1 6 m) 5' 3" (1 6 m)     Temp  Min: 96 5 °F (35 8 °C)  Max: 96 5 °F (35 8 °C)     Height: 5' 3" (160 cm)  Body mass index is 33 66 kg/m²    N/A    Medications:  Current Facility-Administered Medications   Medication Dose Route Frequency   • acetaminophen (TYLENOL) tablet 975 mg  975 mg Oral Q6H [address]   • [START ON 12/1/2022] aspirin tablet 325 mg  325 mg Oral Daily   • ceFAZolin (ANCEF) IVPB (premix in dextrose) 2,000 mg 50 mL  2,000 mg Intravenous Q8H   • citalopram (CeleXA) tablet 20 mg  20 mg Oral QAM   • cyclobenzaprine (FLEXERIL) tablet 10 mg  10 mg Oral TID   • diphenhydrAMINE (BENADRYL) injection 25 mg  25 mg Intravenous Q6H PRN   • docusate sodium (COLACE) capsule 100 mg  100 mg Oral BID   • Doxepin HCl TABS 6 mg  6 mg Oral HS • [START ON 2022] enoxaparin (LOVENOX) subcutaneous injection 40 mg  40 mg Subcutaneous Daily   • gabapentin (NEURONTIN) capsule 300 mg  300 mg Oral TID   • HYDROmorphone (DILAUDID) injection 0 5 mg  0 5 mg Intravenous Q4H PRN   • ondansetron (ZOFRAN) injection 4 mg  4 mg Intravenous Q6H PRN   • oxyCODONE (ROXICODONE) IR tablet 2 5 mg  2 5 mg Oral Q4H PRN    Or   • oxyCODONE (ROXICODONE) IR tablet 5 mg  5 mg Oral Q4H PRN   • [START ON 2022] pantoprazole (PROTONIX) EC tablet 20 mg  20 mg Oral Early Morning   • sodium chloride 0 9 % infusion  100 mL/hr Intravenous Continuous     Home medications:  Prior to Admission Medications   Prescriptions Last Dose Informant Patient Reported? Taking? Biotin 5 MG TABS More than a month  Yes No   Sig: Take 5,000 mcg by mouth every morning   Cholecalciferol 25 MCG (1000 UT) capsule   Yes Yes   Sig: Take 1,000 Units by mouth every morning   Doxepin HCl 6 MG TABS 2022  Yes Yes   Sig: Take 6 mg by mouth daily at bedtime   Elastic Bandages & Supports (B-3 Extra High Comp Hose Women) MISC   Yes No   Sig: Use   Multiple Vitamin (Multi Vitamin Daily) TABS 2022  Yes Yes   Sig: every morning   benzonatate (TESSALON PERLES) 100 mg capsule Not Taking  Yes No   Patient not taking: Reported on 2022   citalopram (CeleXA) 20 mg tablet 2022 at 0530  Yes Yes   Sig: Take 20 mg by mouth every morning   fluticasone (FLONASE) 50 mcg/act nasal spray More than a month  Yes No   Si spray into each nostril if needed   gabapentin (NEURONTIN) 100 mg capsule 2022 at 2000  Yes Yes   Sig: Take 100 mg by mouth daily at bedtime   omeprazole (PriLOSEC) 20 mg delayed release capsule 2022 at 0530  Yes Yes   Sig: Take 20 mg by mouth every morning      Facility-Administered Medications: None     Allergies:   Allergies   Allergen Reactions   • Pollen Extract Other (See Comments)         Laboratory and Diagnostics:        Invalid input(s):  EOSPCT ABG:    VBG:          Micro:            ------------------------------------------------------------------------------------------------------------  Advance Directive and Living Will:      Power of :    POLST:    ------------------------------------------------------------------------------------------------------------  Anticipated Length of Stay is > 2 midnights    Counseling / Coordination of Care  Total Critical Care time spent 0 minutes excluding procedures, teaching and family updates  Alcides Garnett PA-C        Portions of the record may have been created with voice recognition software  Occasional wrong word or "sound a like" substitutions may have occurred due to the inherent limitations of voice recognition software    Read the chart carefully and recognize, using context, where substitutions have occurred

## 2022-12-02 ENCOUNTER — APPOINTMENT (OUTPATIENT)
Dept: RADIOLOGY | Facility: HOSPITAL | Age: 72
End: 2022-12-02

## 2022-12-02 LAB
ANION GAP SERPL CALCULATED.3IONS-SCNC: 6 MMOL/L (ref 4–13)
BASOPHILS # BLD AUTO: 0.03 THOUSANDS/ÂΜL (ref 0–0.1)
BASOPHILS NFR BLD AUTO: 0 % (ref 0–1)
BUN SERPL-MCNC: 12 MG/DL (ref 5–25)
CALCIUM SERPL-MCNC: 7.7 MG/DL (ref 8.3–10.1)
CHLORIDE SERPL-SCNC: 110 MMOL/L (ref 96–108)
CO2 SERPL-SCNC: 24 MMOL/L (ref 21–32)
CREAT SERPL-MCNC: 1.01 MG/DL (ref 0.6–1.3)
EOSINOPHIL # BLD AUTO: 0.05 THOUSAND/ÂΜL (ref 0–0.61)
EOSINOPHIL NFR BLD AUTO: 1 % (ref 0–6)
ERYTHROCYTE [DISTWIDTH] IN BLOOD BY AUTOMATED COUNT: 14.8 % (ref 11.6–15.1)
GFR SERPL CREATININE-BSD FRML MDRD: 56 ML/MIN/1.73SQ M
GLUCOSE SERPL-MCNC: 102 MG/DL (ref 65–140)
GLUCOSE SERPL-MCNC: 116 MG/DL (ref 65–140)
GLUCOSE SERPL-MCNC: 146 MG/DL (ref 65–140)
GLUCOSE SERPL-MCNC: 154 MG/DL (ref 65–140)
HCT VFR BLD AUTO: 27 % (ref 34.8–46.1)
HGB BLD-MCNC: 8.4 G/DL (ref 11.5–15.4)
IMM GRANULOCYTES # BLD AUTO: 0.05 THOUSAND/UL (ref 0–0.2)
IMM GRANULOCYTES NFR BLD AUTO: 1 % (ref 0–2)
LYMPHOCYTES # BLD AUTO: 1.79 THOUSANDS/ÂΜL (ref 0.6–4.47)
LYMPHOCYTES NFR BLD AUTO: 16 % (ref 14–44)
MCH RBC QN AUTO: 28.7 PG (ref 26.8–34.3)
MCHC RBC AUTO-ENTMCNC: 31.1 G/DL (ref 31.4–37.4)
MCV RBC AUTO: 92 FL (ref 82–98)
MONOCYTES # BLD AUTO: 1.02 THOUSAND/ÂΜL (ref 0.17–1.22)
MONOCYTES NFR BLD AUTO: 9 % (ref 4–12)
NEUTROPHILS # BLD AUTO: 8.15 THOUSANDS/ÂΜL (ref 1.85–7.62)
NEUTS SEG NFR BLD AUTO: 73 % (ref 43–75)
NRBC BLD AUTO-RTO: 0 /100 WBCS
PLATELET # BLD AUTO: 121 THOUSANDS/UL (ref 149–390)
PMV BLD AUTO: 11.3 FL (ref 8.9–12.7)
POTASSIUM SERPL-SCNC: 3.9 MMOL/L (ref 3.5–5.3)
RBC # BLD AUTO: 2.93 MILLION/UL (ref 3.81–5.12)
SODIUM SERPL-SCNC: 140 MMOL/L (ref 135–147)
WBC # BLD AUTO: 11.09 THOUSAND/UL (ref 4.31–10.16)

## 2022-12-02 RX ORDER — POLYETHYLENE GLYCOL 3350 17 G/17G
17 POWDER, FOR SOLUTION ORAL DAILY PRN
Status: DISCONTINUED | OUTPATIENT
Start: 2022-12-02 | End: 2022-12-04 | Stop reason: HOSPADM

## 2022-12-02 RX ORDER — SODIUM CHLORIDE, SODIUM GLUCONATE, SODIUM ACETATE, POTASSIUM CHLORIDE, MAGNESIUM CHLORIDE, SODIUM PHOSPHATE, DIBASIC, AND POTASSIUM PHOSPHATE .53; .5; .37; .037; .03; .012; .00082 G/100ML; G/100ML; G/100ML; G/100ML; G/100ML; G/100ML; G/100ML
500 INJECTION, SOLUTION INTRAVENOUS ONCE
Status: COMPLETED | OUTPATIENT
Start: 2022-12-02 | End: 2022-12-02

## 2022-12-02 RX ORDER — AMOXICILLIN 250 MG
1 CAPSULE ORAL
Status: DISCONTINUED | OUTPATIENT
Start: 2022-12-02 | End: 2022-12-04 | Stop reason: HOSPADM

## 2022-12-02 RX ADMIN — OXYCODONE HYDROCHLORIDE 2.5 MG: 5 TABLET ORAL at 05:06

## 2022-12-02 RX ADMIN — ACETAMINOPHEN 975 MG: 325 TABLET ORAL at 17:16

## 2022-12-02 RX ADMIN — INSULIN LISPRO 1 UNITS: 100 INJECTION, SOLUTION INTRAVENOUS; SUBCUTANEOUS at 11:39

## 2022-12-02 RX ADMIN — PANTOPRAZOLE SODIUM 20 MG: 20 TABLET, DELAYED RELEASE ORAL at 05:05

## 2022-12-02 RX ADMIN — ENOXAPARIN SODIUM 40 MG: 40 INJECTION SUBCUTANEOUS at 08:16

## 2022-12-02 RX ADMIN — CEFAZOLIN SODIUM 2000 MG: 2 SOLUTION INTRAVENOUS at 05:05

## 2022-12-02 RX ADMIN — ACETAMINOPHEN 975 MG: 325 TABLET ORAL at 01:39

## 2022-12-02 RX ADMIN — GABAPENTIN 300 MG: 300 CAPSULE ORAL at 17:16

## 2022-12-02 RX ADMIN — ASPIRIN 325 MG ORAL TABLET 325 MG: 325 PILL ORAL at 08:16

## 2022-12-02 RX ADMIN — DOCUSATE SODIUM 100 MG: 100 CAPSULE, LIQUID FILLED ORAL at 08:16

## 2022-12-02 RX ADMIN — CITALOPRAM HYDROBROMIDE 20 MG: 20 TABLET ORAL at 08:16

## 2022-12-02 RX ADMIN — GABAPENTIN 300 MG: 300 CAPSULE ORAL at 21:52

## 2022-12-02 RX ADMIN — CEFAZOLIN SODIUM 2000 MG: 2 SOLUTION INTRAVENOUS at 13:10

## 2022-12-02 RX ADMIN — CYCLOBENZAPRINE HYDROCHLORIDE 10 MG: 10 TABLET, FILM COATED ORAL at 08:16

## 2022-12-02 RX ADMIN — CYCLOBENZAPRINE HYDROCHLORIDE 10 MG: 10 TABLET, FILM COATED ORAL at 17:16

## 2022-12-02 RX ADMIN — CYCLOBENZAPRINE HYDROCHLORIDE 10 MG: 10 TABLET, FILM COATED ORAL at 21:52

## 2022-12-02 RX ADMIN — ACETAMINOPHEN 975 MG: 325 TABLET ORAL at 11:38

## 2022-12-02 RX ADMIN — SENNOSIDES AND DOCUSATE SODIUM 1 TABLET: 8.6; 5 TABLET ORAL at 21:52

## 2022-12-02 RX ADMIN — SODIUM CHLORIDE, SODIUM GLUCONATE, SODIUM ACETATE, POTASSIUM CHLORIDE, MAGNESIUM CHLORIDE, SODIUM PHOSPHATE, DIBASIC, AND POTASSIUM PHOSPHATE 500 ML: .53; .5; .37; .037; .03; .012; .00082 INJECTION, SOLUTION INTRAVENOUS at 11:43

## 2022-12-02 RX ADMIN — GABAPENTIN 300 MG: 300 CAPSULE ORAL at 08:16

## 2022-12-02 NOTE — PLAN OF CARE
Problem: PHYSICAL THERAPY ADULT  Goal: Performs mobility at highest level of function for planned discharge setting  See evaluation for individualized goals  Description: Treatment/Interventions: Functional transfer training, LE strengthening/ROM, Therapeutic exercise, Endurance training, Elevations, Patient/family training, Equipment eval/education, Bed mobility, Gait training, Spoke to nursing  Equipment Recommended:  (none)       See flowsheet documentation for full assessment, interventions and recommendations  Note: Prognosis: Good  Problem List: Decreased strength, Decreased endurance, Impaired balance, Decreased mobility, Pain, Orthopedic restrictions  Assessment: Pt seen for high complexity PT evaluation due to decrease in functional mobility status compared to baseline  Pt with active PT eval/treat orders at this time  Pt is a 81 y o  F who presented to [facility] for b/l breast reconstruction on 11/30/22  Pt  has a past medical history of Cancer ([address] ), CPAP (continuous positive airway pressure) dependence, Depression, GERD (gastroesophageal reflux disease), Restless leg syndrome, and Sleep apnea  Pt resides with spouse in [facility] with 7[facility]  Pt presents with decreased strength, balance, endurance, pain that contribute to limitations in bed mobility, functional transfers, functional mobility  Pt requires Min A for STS and ambulation at this time  Pt left upright in bedside chair with all needs in reach  Pt will benefit from skilled therapy in order to address current impairments and functional limitations  PT to follow pt and recommending home  The patient's AM-PAC Basic Mobility Inpatient Short Form Raw Score is 18  A Raw score of greater than 16 suggests the patient may benefit from discharge to home  Please also refer to the recommendation of the Physical Therapist for safe discharge planning          PT Discharge Recommendation: No rehabilitation needs    See flowsheet documentation for full assessment

## 2022-12-02 NOTE — PROGRESS NOTES
Daily Progress Note - Critical Care   Brian Galindo 81 y o  female MRN: [id number]  Unit/Bed#: [facility] 517-01 Encounter: [id number]        ----------------------------------------------------------------------------------------  HPI/24hr events: 81 y o  female with past medical history of depression, GERD, CVA, CKD 3, SARIKA, obesity, and breast cancer who now presents status post bilateral LIVE flap  Vagal episode while ambulating with PT OT yesterday, hypotension improved with 500 cc of IV fluid  Low-grade temp to 100 6  Were CPAP overnight but requested be taken off this morning    ---------------------------------------------------------------------------------------  SUBJECTIVE      Review of Systems   Constitutional: Positive for fatigue  Gastrointestinal: Positive for abdominal pain  Review of systems was reviewed and negative unless stated above in HPI/24-hour events   ---------------------------------------------------------------------------------------  Assessment and Plan:    Neuro:   • Analgesia:  Around the clock Tylenol, Flexeril t i d , gabapentin t i d   o Received 1 dose of 2 5 oxycodone a 24 hours  o Consider discontinuing IV Dilaudid  • Sedation:  Na  • Delirium PPX: CAM-ICU, Sleep Hygiene   o History of depression:  Continue home Celexa  -  to bring in doxepin      CV:   • History of hypertension:  P r n  Labetalol      Pulm:  • SARIKA:  Encourage q h s   CPAP  • Postoperative pulmonary insufficiency on 2 L by nasal cannula:  Check chest x-ray this morning       GI:   • Regular diet  • Monitor for ileus  • Start bowel regimen  • GI prophylaxis Protonix      :   • CKD 3:  Creatinine stable at baseline  • History of breast cancer now postop day 2 status post LIVE flap  o Continue vioptix checks per surgery  o Aspirin      F/E/N:   • No IV fluid  • Electrolytes - Monitor/trend - replete based on deficiencies       Heme/Onc:   • Hemoglobin stable  • DVT PPX:  Lovenox, SCDs      Endo: • Diabetes:  Sliding scale insulin      ID:   • No active issues      MSK/Skin:   • PT/OT  • Ambulate as tolerated      Disposition: Transfer to Stepdown Level 2  Code Status: Level 1 - Full Code  ---------------------------------------------------------------------------------------  ICU CORE MEASURES    Prophylaxis   VTE Pharmacologic Prophylaxis: Enoxaparin (Lovenox)  VTE Mechanical Prophylaxis: sequential compression device  Stress Ulcer Prophylaxis: Pantoprazole PO      Invasive Devices Review  Invasive Devices     Peripheral Intravenous Line  Duration           Peripheral IV 11/30/22 Right Hand 1 day    Peripheral IV 12/01/22 Right Antecubital 1 day          Drain  Duration           Closed/Suction Drain Inferior; Lateral;Right Breast Bulb 15 Fr  1 day    Closed/Suction Drain Lateral RLQ Bulb 19 Fr  1 day    Closed/Suction Drain Lateral;Left Breast Bulb 15 Fr  1 day    Closed/Suction Drain Lateral;Left LLQ Bulb 19 Fr  1 day    Urethral Catheter Latex; Temperature probe 16 Fr  1 day              Can any invasive devices be discontinued today? Not applicable  ---------------------------------------------------------------------------------------  OBJECTIVE    Physical Exam  Constitutional:       Appearance: She is obese  HENT:      Head: Normocephalic  Mouth/Throat:      Mouth: Mucous membranes are moist    Eyes:      Pupils: Pupils are equal, round, and reactive to light  Cardiovascular:      Rate and Rhythm: Normal rate and regular rhythm  Pulmonary:      Effort: Pulmonary effort is normal       Breath sounds: Normal breath sounds  Abdominal:      General: There is distension  Palpations: Abdomen is soft  Tenderness: There is no abdominal tenderness  Comments: Incision c/d/i   Musculoskeletal:      Cervical back: Normal range of motion  Right lower leg: Edema present  Left lower leg: Edema present  Skin:     General: Skin is warm        Capillary Refill: Capillary refill takes less than 2 seconds  Neurological:      General: No focal deficit present  Mental Status: She is alert  Vitals   Vitals:    22 0143 22 0200 22 0300 22 0320   BP:  123/58 112/57    BP Location:       Pulse: 74 75 75 68   Resp: 22 20 15 16   Temp: 100 4 °F (38 °C) 100 2 °F (37 9 °C) (!) 100 6 °F (38 1 °C) (!) 100 6 °F (38 1 °C)   TempSrc:       SpO2: 95% 94% (!) 88% 97%   Weight:       Height:         Temp (24hrs), Av 6 °F (37 6 °C), Min:98 8 °F (37 1 °C), Max:100 6 °F (38 1 °C)  Current: Temperature: (!) 100 6 °F (38 1 °C)      Invasive/non-invasive ventilation settings   Respiratory    Lab Data (Last 4 hours)    None         O2/Vent Data (Last 4 hours)    None                Height and Weights   Height: 5' 3" (160 cm)     Body mass index is 33 66 kg/m²  Weight (last 2 days)     Date/Time Weight    22 0646 86 2 (190)            Intake and Output  I/O        0701   0700  0701   0700    P  O   720    I V  (mL/kg) 5920 (68 7) 1903 3 (22 1)    IV Piggyback 870 100    Total Intake(mL/kg) 6790 (78 8) 2723 3 (31 6)    Urine (mL/kg/hr) 2460 (1 2) 1190 (0 6)    Drains 105 175    Stool  0    Blood 350     Total Output 2915 1365    Net +3875 +1358 3          Unmeasured Stool Occurrence  0 x            Nutrition       Diet Orders   (From admission, onward)             Start     Ordered    22 0843  Diet Regular; Regular House  Diet effective now        References:    Nutrtion Support Algorithm Enteral vs  Parenteral   Question Answer Comment   Diet Type Regular    Regular Regular House    RD to adjust diet per protocol?  No        22 0844                  Laboratory and Diagnostics:  Results from last 7 days   Lab Units 22  0424 22  2220 22  1901 22  1701 22  1456 22  1207   WBC Thousand/uL 14 43* 13 73*  --   --   --   --    HEMOGLOBIN g/dL 9 2* 9 8*  --   --   --   --    I STAT HEMOGLOBIN g/dl  --   -- 10 2* 10 2* 10 5* 12 2   HEMATOCRIT % 29 7* 31 1*  --   --   --   --    HEMATOCRIT, ISTAT %  --   --  30* 30* 31* 36   PLATELETS Thousands/uL 128* 127*  --   --   --   --      Results from last 7 days   Lab Units 12/01/22  0424 11/30/22  2220 11/30/22  1901 11/30/22  1701 11/30/22  1456 11/30/22  1207   SODIUM mmol/L 140 138  --   --   --   --    POTASSIUM mmol/L 3 8 4 1  --   --   --   --    CHLORIDE mmol/L 110* 109*  --   --   --   --    CO2 mmol/L 24 21  --   --   --   --    CO2, I-STAT mmol/L  --   --  23 24 24 25   ANION GAP mmol/L 6 8  --   --   --   --    BUN mg/dL 10 10  --   --   --   --    CREATININE mg/dL 1 10 1 35*  --   --   --   --    CALCIUM mg/dL 7 3* 7 7*  --   --   --   --    GLUCOSE RANDOM mg/dL 161* 192*  --   --   --   --      Results from last 7 days   Lab Units 11/30/22  2220   MAGNESIUM mg/dL 2 0   PHOSPHORUS mg/dL 3 2               Results from last 7 days   Lab Units 12/01/22  0136 11/30/22  2220   LACTIC ACID mmol/L 2 0 3 9*     ABG:  Results from last 7 days   Lab Units 11/30/22  2219   [address] ART  7 345*   PCO2 ART mm Hg 36 8   PO2 ART mm Hg 101 1   HCO3 ART mmol/L 19 6*   BASE EXC ART mmol/L -5 5   ABG SOURCE  Line, Arterial     VBG:  Results from last 7 days   Lab Units 11/30/22  2219   ABG SOURCE  Line, Arterial           Micro            Active Medications  Scheduled Meds:  Current Facility-Administered Medications   Medication Dose Route Frequency Provider Last Rate   • acetaminophen  975 mg Oral Q6H [facility] Michael Garrett PA-C     • aspirin  325 mg Oral Daily Stefani Murcia PA-C     • cefazolin  2,000 mg Intravenous Q8H Stefani Murcia PA-C Stopped (12/01/22 2200)   • citalopram  20 mg Oral QAM Stefani Murcia PA-C     • cyclobenzaprine  10 mg Oral TID Stefani Murcia PA-C     • diphenhydrAMINE  25 mg Intravenous Q6H PRN Stefani Murcia PA-C     • docusate sodium  100 mg Oral BID Stefani Murcia PA-C     • Doxepin HCl  6 mg Oral HS Zehra Donovan PA-C     • enoxaparin  40 mg Subcutaneous Daily Zehra Donovan PA-C     • gabapentin  300 mg Oral TID Zehra Donovan PA-C     • HYDROmorphone  0 5 mg Intravenous Q4H PRN Zehra Donovan PA-C     • insulin lispro  1-6 Units Subcutaneous TID AC Angi Phillips PA-C     • labetalol  10 mg Intravenous Q6H PRN Trent ChisagoKOFFI dai     • ondansetron  4 mg Intravenous Q6H PRN Zehra Donovan PA-C     • oxyCODONE  2 5 mg Oral Q4H PRN Trent ChisagoKOFFI dai      Or   • oxyCODONE  5 mg Oral Q4H PRN Trent ChisagoKOFFI dai     • pantoprazole  20 mg Oral Early Morning Zehra Donovan PA-C       Continuous Infusions:     PRN Meds:   diphenhydrAMINE, 25 mg, Q6H PRN  HYDROmorphone, 0 5 mg, Q4H PRN  labetalol, 10 mg, Q6H PRN  ondansetron, 4 mg, Q6H PRN  oxyCODONE, 2 5 mg, Q4H PRN   Or  oxyCODONE, 5 mg, Q4H PRN        Allergies   Allergies   Allergen Reactions   • Pollen Extract Other (See Comments)       Advance Directive and Living Will:      Power of :    POLST:        Counseling / Coordination of Care  Total Critical Care time spent 0 minutes excluding procedures, teaching and family updates  Trent Owens PA-C        Portions of the record may have been created with voice recognition software  Occasional wrong word or "sound a like" substitutions may have occurred due to the inherent limitations of voice recognition software    Read the chart carefully and recognize, using context, where substitutions have occurred

## 2022-12-02 NOTE — PROGRESS NOTES
Patient seen and examined  No complaints  No issues overnight  Vioptix stable  States pain controlled    BP 96/51 (BP Location: Right arm)   Pulse 74   Temp 98 6 °F (37 °C) (Probe)   Resp 20   Ht 5' 3" (1 6 m)   Wt 86 2 kg (190 lb)   SpO2 94%   BMI 33 66 kg/m²           Bilateral breasts flaps-- perfused but pale, right breast with some ecchymosis along skin paddle  No evidence of venous congestion  Vioptix has remained in the low 30s-20s and stable bilaterally  Abdomen, soft, non distended, minimally tender    Discussion- discussed with patient that her flaps have had poor perfusion despite a functional anastomosis and that she may experience some flap necrosis, although there is no acute surgical intervention  I discussed with her we will continue to monitor  I do believe that as her choke vessels open in the flap, the perfusion will improve  Will continue to monitor today    If stable likely discharge to the floor in am

## 2022-12-02 NOTE — OCCUPATIONAL THERAPY NOTE
Occupational Therapy Evaluation     Patient Name: Shelia MOON Date: 12/2/2022  Problem List  Principal Problem:    S/P breast reconstruction, bilateral  Active Problems:    Chronic kidney disease, stage 3a ([facility] [address] )    Prediabetes    Obesity    GERD (gastroesophageal reflux disease)    CPAP (continuous positive airway pressure) dependence    Admission for breast reconstruction following mastectomy    Breast cancer [facility])    Past Medical History  Past Medical History:   Diagnosis Date    Cancer ([facility] )     CPAP (continuous positive airway pressure) dependence     Depression     GERD (gastroesophageal reflux disease)     Restless leg syndrome     Sleep apnea      Past Surgical History  Past Surgical History:   Procedure Laterality Date    BILATERAL OOPHORECTOMY      ELBOW FRACTURE SURGERY      MASTECTOMY      FL BREAST RECONSTRUCTION W/FREE FLAP Bilateral 11/30/2022    Procedure: RECONSTRUCTION BREAST W/FLAP FREE DEEP INFERIOR EPIGASTRIC  (LIVE); Surgeon: Alejandrina Chang MD;  [facility]: [facility];  Service: Plastics    RHINOPLASTY      SINUS SURGERY          12/02/22 0955   OT Last Visit   OT Visit Date 12/02/22   Note Type   Note type Evaluation   Pain Assessment   Pain Assessment Tool 0-10   Pain Score No Pain   Restrictions/Precautions   Weight Bearing Precautions Per Order Yes   RUE Weight Bearing Per Order (S)  NWB   LUE Weight Bearing Per Order (S)  NWB   Other Precautions Multiple lines; Fall Risk  (no use of RW, no pushing)   Home Living   Type of [address] [place] [address] Two level;Stairs to enter with rails;Bed/bath upstairs  ([address])   Bathroom Shower/Tub Tub/shower unit   Bathroom Toilet Standard   Bathroom Equipment   (denies)   [address]   (denies)   Prior Function   Level of [place] Independent with functional mobility; Independent with ADLs; Independent with IADLS   Lives With Spouse   Receives Help From Family   IADLs Independent with driving; Independent with meal prep; Independent with medication management   Falls in the last 6 months 0   Vocational Retired   Lifestyle   Autonomy pta, pt was I W ADL/IADL, no AD mobility  +    Reciprocal Relationships supportive spouse who she lives with ,supportive son who is local and can A as needed  Service to Others retired   Intrinsic Gratification spending time w grandchildren   Subjective   Subjective "I am doing ok"   ADL   Where Assessed Edge of bed   Eating Assistance 5  Supervision/Setup   Grooming Assistance 5  Supervision/Setup   UB Bathing Assistance 4  Minimal Assistance   LB Bathing Assistance 3  Moderate Assistance   UB Dressing Assistance 4  [address] 3  Moderate [address]  3  Moderate Assistance   Functional Assistance 4  Minimal Assistance   Bed Mobility   Additional Comments found and left in bed w all needs in reach   Transfers   Sit to Stand 4  Minimal assistance   Additional items Assist x 1; Increased time required;Verbal cues   Stand to Sit 4  Minimal assistance   Additional items Assist x 1; Increased time required;Verbal cues   Additional Comments no AD due to order from physician to not use RW   Functional Mobility   Functional Mobility 4  Minimal assistance   Additional Comments ax1, household distance- no AD 2' sx precautions   Balance   Static Sitting Good   Dynamic Sitting Fair +   Static Standing Fair   Dynamic Standing Fair -   Ambulatory Poor +   Activity Tolerance   Activity Tolerance Patient tolerated treatment well   Medical Staff Made Aware DPT Cory Rashid 2' pts med complexity, comorbidities and regression from baseline     Nurse Made Aware ok per RN   RUE Assessment   RUE Assessment   (WFL, however is NWB and no pushing/pulling)   LUE Assessment   LUE Assessment   (WFL, however is NWB and no pushing/pulling)   Hand Function   Gross Motor Coordination Functional   Fine Motor Coordination Functional Psychosocial   Psychosocial (WDL) WDL   Cognition   Overall Cognitive Status WFL   Arousal/Participation Alert; Responsive; Cooperative   Attention Within functional limits   Orientation Level Oriented X4   Memory Within functional limits   Following Commands Follows one step commands without difficulty   Comments pleasant and cooperative, overall G safety awareness and insight to condition  Assessment   Limitation Decreased ADL status; Decreased self-care trans;Decreased endurance;Decreased UE ROM; Decreased UE strength   Prognosis Good   Assessment Pt is a 81 y o  female admitted 11/30/22 for b/l breast reconstruction w LIVE flaps  Pt underwent said procedure 11/30/22, POD #2 w active OT eval and treat orders, NWB in b/l UE orders, no use of RW and no pushing/pulling w UE's  PMH includes  has a past medical history of Cancer ([address] ), CPAP (continuous positive airway pressure) dependence, Depression, GERD (gastroesophageal reflux disease), Restless leg syndrome, and Sleep apnea  Pt lives w spouse in a [facility] with [address], reports bed/bath upstairs with tub shower and standard toilet  Pta, pt was independent w/ ADL/IADL and functional mobility, was  driving and was not using any DME at baseline  Currently, pt is mod A for UB ADL, max A for LB ADL (2' pain and NWB status- reports spouse will be able to assist as needed)  Pt is limited at this time 2* decreased endurance/activtiy tolerance, decreased UE strength/ROM, decreased ADL/High-level ADL status, decreased self-care trans, and is a fall risk  This impacts pt's ability to complete UB and LB dressing and bathing, toileting, transfers, functional mobility, community mobility, home and health maintenance, and safe engagement in typical daily routine  The patient's raw score on the AM-PAC Daily Activity inpatient short form is 20, standardized score is 42 03, greater than 39 4  Patients at this level are likely to benefit from discharge to home   Please refer to the recommendation of the Occupational Therapist for safe discharge planning  From OT standpoint, pt should D/C to home when medically stable  Pt will benefit from continued acute OT services 2-4x/wk for 10-14 days to meet goals  Goals   Patient Goals get better   LTG Time Frame 10-14   Long Term Goal #1 see below   Plan   Treatment Interventions ADL retraining;Functional transfer training; Endurance training;Energy conservation; Activityengagement;UE strengthening/ROM; Equipment evaluation/education; Compensatory technique education   Goal Expiration Date 12/16/22   OT Frequency   (2-4x/wk)   Recommendation   OT Discharge Recommendation No rehabilitation needs   Equipment Recommended Bedside commode; Shower/Tub chair with back ($)   Commode Type Standard   AM-PAC Daily Activity Inpatient   Lower Body Dressing 3   Bathing 3   Toileting 3   Upper Body Dressing 3   Grooming 4   Eating 4   Daily Activity Raw Score 20   Daily Activity Standardized Score (Calc for Raw Score >=11) 42 03   AM-PAC Applied Cognition Inpatient   Following a Speech/Presentation 4   Understanding Ordinary Conversation 4   Taking Medications 4   Remembering Where Things Are Placed or Put Away 4   Remembering List of 4-5 Errands 4   Taking Care of Complicated Tasks 4   Applied Cognition Raw Score 24   Applied Cognition Standardized Score 62 21   Modified [place] Scale   Modified Kane Scale 3   End of Consult   Education Provided Yes   Patient Position at End of Consult Bedside chair; All needs within reach   Nurse Communication Nurse aware of consult     Pt will complete functional mobility with Mod I using appropriate DME as needed  Pt will complete UB dressing and bathing with Mod I using appropriate DME as needed  Pt will complete LB dressing and bathing with Mod I using appropriate DME as needed  Pt will complete transfers with Mod I using appropriate DME as needed       Pt will complete toileting with Mod I using appropriate DME as needed  Pt will utilize energy conservation techniques throughout functional activity/ADL s/p skilled education  Pt will demonstrate increased safety awareness during functional tasks/ADL's s/p skilled education  Pt will increase activity tolerance to 30 minutes in order to complete ADL's/ functional tasks, using appropriate DME as needed  Pt will inc UE ROM +10 degrees b/l in order to increase overall ability to engage in typical daily routine  Pt will inc UE strength +1 MMT in order to increase overall ability to engage in typical daily routine       Ed Gola, MOT, OTR/L

## 2022-12-02 NOTE — PHYSICAL THERAPY NOTE
Physical Therapy Evaluation     Patient's Name: Maria Luisa Lucio    Admitting Diagnosis  History of breast reconstruction [[id number]]    Problem List  Patient Active Problem List   Diagnosis    Chronic kidney disease, stage 3a ([facility] )    Prediabetes    Obesity    GERD (gastroesophageal reflux disease)    CPAP (continuous positive airway pressure) dependence    Admission for breast reconstruction following mastectomy    Breast cancer ([facility] )    S/P breast reconstruction, bilateral       Past Medical History  Past Medical History:   Diagnosis Date    Cancer ([place] [address] )     CPAP (continuous positive airway pressure) dependence     Depression     GERD (gastroesophageal reflux disease)     Restless leg syndrome     Sleep apnea        Past Surgical History  Past Surgical History:   Procedure Laterality Date    BILATERAL OOPHORECTOMY      ELBOW FRACTURE SURGERY      MASTECTOMY      CT BREAST RECONSTRUCTION W/FREE FLAP Bilateral 11/30/2022    Procedure: RECONSTRUCTION BREAST W/FLAP FREE DEEP INFERIOR EPIGASTRIC  (LIVE); Surgeon: Viktor Pelaez MD;  [facility];  Service: Plastics    RHINOPLASTY      SINUS SURGERY            12/02/22 0950   PT Last Visit   PT Visit Date 12/02/22   Note Type   Note type Evaluation   Pain Assessment   Pain Assessment Tool 0-10   Pain Score 5   Pain Location/Orientation Location: Incision   Restrictions/Precautions   Weight Bearing Precautions Per Order Yes   RUE Weight Bearing Per Order (S)  NWB   LUE Weight Bearing Per Order (S)  NWB   Other Precautions Multiple lines;Telemetry; Fall Risk;Pain  (No pushing with UE, no use of RW)   Home Living   Type [address] Two level  (7STE)   Bathroom Shower/Tub Tub/shower unit   Bathroom Toilet Standard   Bathroom Equipment   (denies)   Home Equipment   (denies)   Prior Function   Level of [place] Independent with ADLs; Independent with functional mobility; Independent with IADLS   Lives With Spouse   IADLs Independent with driving   Falls in the last 6 months 0   Vocational Retired   General   Family/Caregiver Present No   Cognition   Overall Cognitive Status WFL   Arousal/Participation Alert   Attention Within functional limits   Orientation Level Oriented X4   Memory Decreased long term memory; Within functional limits   Following Commands Follows one step commands without difficulty   Subjective   Subjective Pleasant and agreeable to participate in therapy session  RLE Assessment   RLE Assessment   (functionally 4-/5)   LLE Assessment   LLE Assessment   (functionally 4-/5)   Bed Mobility   Additional Comments Found and left OOB in chair with all needs in reach  Transfers   Sit to Stand 4  Minimal assistance   Additional items Assist x 1; Increased time required;Verbal cues   Stand to Sit 4  Minimal assistance   Additional items Assist x 1; Increased time required;Verbal cues   Additional Comments without AD   Ambulation/Elevation   Gait pattern Excessively slow; Antalgic  (guarded 2/2 pain)   Gait Assistance 4  Minimal assist   Additional items Assist x 1;Verbal cues; Tactile cues   Assistive Device None   Distance 40 ft x 2   Balance   Static Sitting Good   Dynamic Sitting Fair +   Static Standing Fair   Dynamic Standing Fair -   Ambulatory Poor +   Activity Tolerance   Activity Tolerance Patient limited by pain   Medical Staff Made Aware OT [place] Simons   Nurse Made Aware RN cleared pt to be seen by PT   Assessment   Prognosis Good   Problem List Decreased strength;Decreased endurance; Impaired balance;Decreased mobility;Pain;Orthopedic restrictions   Assessment Pt seen for high complexity PT evaluation due to decrease in functional mobility status compared to baseline  Pt with active PT eval/treat orders at this time  Pt is a 81 y o  F who presented to [facility] for b/l breast reconstruction on 11/30/22    Pt  has a past medical history of Cancer ([address] ), CPAP (continuous positive airway pressure) dependence, Depression, GERD (gastroesophageal reflux disease), Restless leg syndrome, and Sleep apnea  Pt resides with spouse in [facility] with [address]STE  Pt presents with decreased strength, balance, endurance, pain that contribute to limitations in bed mobility, functional transfers, functional mobility  Pt requires Min A for STS and ambulation at this time  Pt left upright in bedside chair with all needs in reach  Pt will benefit from skilled therapy in order to address current impairments and functional limitations  PT to follow pt and recommending home  The patient's AM-PAC Basic Mobility Inpatient Short Form Raw Score is 18  A Raw score of greater than 16 suggests the patient may benefit from discharge to home  Please also refer to the recommendation of the Physical Therapist for safe discharge planning  Goals   Patient Goals to get better   [facility] Expiration Date 12/16/22   Short Term Goal #1 1  Pt will demonstrate ability to perform all aspects of bed mobility with I in order to increase independence and decrease burden on caregivers  2  Pt will demonstrate ability to perform functional transfers with Mod I in order to increase independence and decrease burden on caregivers  3  Pt will demonstrate ability to ambulate 200 ft with least restrictive AD with Mod I in order to return to mobility safely  4  Pt will demonstrate ability to negotiate full flight steps with/without HR and Mod I in order to return to household/community mobility safely  5  Pt will demonstrate improved balance by one grade order to decrease risk of falls  6  Pt will increase b/l LE strength by 1 grade in order to increase ease of functional mobility and transfers  Plan   Treatment/Interventions Functional transfer training;LE strengthening/ROM; Therapeutic exercise; Endurance training;Elevations; Patient/family training;Equipment eval/education; Bed mobility;Gait training;Spoke to nursing   PT Frequency 3-5x/wk   Recommendation   PT Discharge Recommendation No rehabilitation needs   Equipment Recommended   (none)   AM-PAC Basic Mobility Inpatient   Turning in Bed Without Bedrails 3   Lying on Back to Sitting on Edge of Flat Bed 3   Moving Bed to Chair 3   Standing Up From Chair 3   Walk in Room 3   Climb 3-5 Stairs 3   Basic Mobility Inpatient Raw Score 18   Basic Mobility Standardized Score 41 05   Highest Level Of Mobility   JH-HLM Goal 6: Walk 10 steps or more   JH-HLM Achieved 7: Walk 25 feet or more   Modified [place] Scale   Modified [place] Scale 4         Angi Levy, PT, DPT

## 2022-12-03 LAB
ANION GAP SERPL CALCULATED.3IONS-SCNC: 5 MMOL/L (ref 4–13)
BASOPHILS # BLD AUTO: 0.03 THOUSANDS/ÂΜL (ref 0–0.1)
BASOPHILS NFR BLD AUTO: 0 % (ref 0–1)
BUN SERPL-MCNC: 10 MG/DL (ref 5–25)
CALCIUM SERPL-MCNC: 8.2 MG/DL (ref 8.3–10.1)
CHLORIDE SERPL-SCNC: 108 MMOL/L (ref 96–108)
CO2 SERPL-SCNC: 25 MMOL/L (ref 21–32)
CREAT SERPL-MCNC: 0.98 MG/DL (ref 0.6–1.3)
EOSINOPHIL # BLD AUTO: 0.18 THOUSAND/ÂΜL (ref 0–0.61)
EOSINOPHIL NFR BLD AUTO: 2 % (ref 0–6)
ERYTHROCYTE [DISTWIDTH] IN BLOOD BY AUTOMATED COUNT: 14.6 % (ref 11.6–15.1)
GFR SERPL CREATININE-BSD FRML MDRD: 58 ML/MIN/1.73SQ M
GLUCOSE SERPL-MCNC: 110 MG/DL (ref 65–140)
GLUCOSE SERPL-MCNC: 123 MG/DL (ref 65–140)
GLUCOSE SERPL-MCNC: 124 MG/DL (ref 65–140)
GLUCOSE SERPL-MCNC: 185 MG/DL (ref 65–140)
HCT VFR BLD AUTO: 27.6 % (ref 34.8–46.1)
HGB BLD-MCNC: 8.7 G/DL (ref 11.5–15.4)
IMM GRANULOCYTES # BLD AUTO: 0.04 THOUSAND/UL (ref 0–0.2)
IMM GRANULOCYTES NFR BLD AUTO: 0 % (ref 0–2)
LYMPHOCYTES # BLD AUTO: 1.88 THOUSANDS/ÂΜL (ref 0.6–4.47)
LYMPHOCYTES NFR BLD AUTO: 18 % (ref 14–44)
MAGNESIUM SERPL-MCNC: 2.3 MG/DL (ref 1.6–2.6)
MCH RBC QN AUTO: 28.8 PG (ref 26.8–34.3)
MCHC RBC AUTO-ENTMCNC: 31.5 G/DL (ref 31.4–37.4)
MCV RBC AUTO: 91 FL (ref 82–98)
MONOCYTES # BLD AUTO: 0.64 THOUSAND/ÂΜL (ref 0.17–1.22)
MONOCYTES NFR BLD AUTO: 6 % (ref 4–12)
NEUTROPHILS # BLD AUTO: 7.82 THOUSANDS/ÂΜL (ref 1.85–7.62)
NEUTS SEG NFR BLD AUTO: 74 % (ref 43–75)
NRBC BLD AUTO-RTO: 0 /100 WBCS
PLATELET # BLD AUTO: 136 THOUSANDS/UL (ref 149–390)
POTASSIUM SERPL-SCNC: 4.2 MMOL/L (ref 3.5–5.3)
RBC # BLD AUTO: 3.02 MILLION/UL (ref 3.81–5.12)
SODIUM SERPL-SCNC: 138 MMOL/L (ref 135–147)
WBC # BLD AUTO: 10.59 THOUSAND/UL (ref 4.31–10.16)

## 2022-12-03 RX ADMIN — GABAPENTIN 300 MG: 300 CAPSULE ORAL at 17:01

## 2022-12-03 RX ADMIN — ACETAMINOPHEN 975 MG: 325 TABLET ORAL at 23:19

## 2022-12-03 RX ADMIN — OXYCODONE HYDROCHLORIDE 2.5 MG: 5 TABLET ORAL at 06:37

## 2022-12-03 RX ADMIN — ACETAMINOPHEN 975 MG: 325 TABLET ORAL at 17:01

## 2022-12-03 RX ADMIN — INSULIN LISPRO 1 UNITS: 100 INJECTION, SOLUTION INTRAVENOUS; SUBCUTANEOUS at 12:26

## 2022-12-03 RX ADMIN — GABAPENTIN 300 MG: 300 CAPSULE ORAL at 09:01

## 2022-12-03 RX ADMIN — PANTOPRAZOLE SODIUM 20 MG: 20 TABLET, DELAYED RELEASE ORAL at 06:37

## 2022-12-03 RX ADMIN — CYCLOBENZAPRINE HYDROCHLORIDE 10 MG: 10 TABLET, FILM COATED ORAL at 17:01

## 2022-12-03 RX ADMIN — CYCLOBENZAPRINE HYDROCHLORIDE 10 MG: 10 TABLET, FILM COATED ORAL at 09:01

## 2022-12-03 RX ADMIN — GABAPENTIN 300 MG: 300 CAPSULE ORAL at 21:18

## 2022-12-03 RX ADMIN — CYCLOBENZAPRINE HYDROCHLORIDE 10 MG: 10 TABLET, FILM COATED ORAL at 21:18

## 2022-12-03 RX ADMIN — ACETAMINOPHEN 975 MG: 325 TABLET ORAL at 12:23

## 2022-12-03 RX ADMIN — CITALOPRAM HYDROBROMIDE 20 MG: 20 TABLET ORAL at 09:02

## 2022-12-03 RX ADMIN — ASPIRIN 325 MG ORAL TABLET 325 MG: 325 PILL ORAL at 09:01

## 2022-12-03 RX ADMIN — ENOXAPARIN SODIUM 40 MG: 40 INJECTION SUBCUTANEOUS at 09:01

## 2022-12-03 RX ADMIN — SENNOSIDES AND DOCUSATE SODIUM 1 TABLET: 8.6; 5 TABLET ORAL at 21:18

## 2022-12-03 NOTE — PROGRESS NOTES
Daily Progress Note - Critical Care   Mary Anne Causey 81 y o  female MRN: [id number]  [facility]: [id number]        ----------------------------------------------------------------------------------------  HPI/24hr events: 81 y o  female with past medical history of depression, GERD, CVA, CKD 3, SARIKA, obesity, and breast cancer who now presents status post bilateral LIVE flap       500 IVF yesterday for lower UOP  OOB to chair during day  Ambulating halls  On RA     ---------------------------------------------------------------------------------------  SUBJECTIVE  Patient without complaints  Review of Systems   Constitutional: Positive for fatigue  Respiratory: Negative for chest tightness and shortness of breath  Cardiovascular: Negative for chest pain  Gastrointestinal: Negative for nausea and vomiting  All other systems reviewed and are negative     ---------------------------------------------------------------------------------------  Assessment and Plan:    Neuro diagnosis: Depression  · Plan:  · Pain controlled with:  · Scheduled: tylenol ATC, Neurontin 300 mg TID, Flexiril 10 TID  · PRN: dilaudid IV and oxycodone  · Regulate sleep/wake cycle as able  · Doxepin qHS  · Delirium monitoring and precautions  · CAM-ICU daily  · Trend neuro exam  · Celexa 20 mg daily   Cardiac diagnosis: HTN  · Plan:  · Follow rhythm on telemetry while in ICU  · PRN labetalol   Pulmonary diagnosis: SARIKA  · Plan:  · SpO2 goal >90%  · Pulmonary toileting as able  · CPAP qHS  GI diagnosis: None  · Plan:  · Bowel regimen: [place]  FEN: None  · Plan:  · Nutrition/diet plan: PO diet  · Replenish electrolytes with goals: K >4 0, Mag >2 0, and Phos >3 0  Renal/ diagnosis: CKD 3  · Plan:  · Indwelling Fish present: yes   · Urinary catheter no longer needed  Will place order to D/C    · Trend UOP and BUN/creat  · Strict I and O  ID diagnosis: None  · Plan:  · Trend temps and WBC count  · Maintain normothermia  Heme/onc diagnosis: Breast CA s/p LIVE flap  · Plan:  · Trend hgb and plts  · Transfuse as needed for goal hgb >7 0  · q2 hour flap checks per plastic surgery  ·  mg  Endo diagnosis: DM2  · Plan:  · Glycemic control plan: SQ insulin  MSK/Skin diagnosis: None  · Plan:  · Mobility goal: OOB  · PT consult: yes  · OT consult: yes  · Frequent turning and pressure off-loading  · Local wound care as needed    Patient appropriate for transfer out of the ICU today?: No  Disposition: Transfer to Stepdown Level 2  Code Status: Level 1 - Full Code  ---------------------------------------------------------------------------------------  ICU CORE MEASURES    Prophylaxis   VTE Pharmacologic Prophylaxis: Enoxaparin (Lovenox)  VTE Mechanical Prophylaxis: sequential compression device  Stress Ulcer Prophylaxis: Pantoprazole PO    Invasive Devices Review  Invasive Devices     Peripheral Intravenous Line  Duration           Peripheral IV 22 Right Hand 2 days    Peripheral IV 22 Right Antecubital 2 days          Drain  Duration           Closed/Suction Drain Inferior; Lateral;Right Breast Bulb 15 Fr  2 days    Closed/Suction Drain Lateral RLQ Bulb 19 Fr  2 days    Closed/Suction Drain Lateral;Left Breast Bulb 15 Fr  2 days    Closed/Suction Drain Lateral;Left LLQ Bulb 19 Fr  2 days    Urethral Catheter Latex; Temperature probe 16 Fr  2 days              Can any invasive devices be discontinued today?  No  ---------------------------------------------------------------------------------------  OBJECTIVE    Vitals   Vitals:    22 2100 22 2300 22 0000 22 0200   BP: 142/64 122/60 122/60 106/52   BP Location: Right arm Right arm Right arm Right arm   Pulse: 69 66 69 72   Resp: 17 17  21   Temp: 99 3 °F (37 4 °C) 99 9 °F (37 7 °C) 99 9 °F (37 7 °C) 99 5 °F (37 5 °C)   TempSrc:  Probe  Probe   SpO2: 94% 94% 95% 97%   Weight:       Height:         Temp (24hrs), Av 4 °F (37 4 °C), Min:98 4 °F (36 9 °C), Max:100 6 °F (38 1 °C)  Current: Temperature: 99 5 °F (37 5 °C)    Respiratory:  SpO2: SpO2: 97 %, SpO2 Activity: SpO2 Activity: At Rest, SpO2 Device: O2 Device: CPAP    Invasive/non-invasive ventilation settings   Respiratory    Lab Data (Last 4 hours)    None         O2/Vent Data (Last 4 hours)      12/02 2300          Non-Invasive Ventilation Mode CPAP                 Physical Exam  Vitals reviewed  Constitutional:       General: She is awake  Cardiovascular:      Rate and Rhythm: Normal rate and regular rhythm  Heart sounds: No murmur heard  No friction rub  Pulmonary:      Effort: Pulmonary effort is normal       Breath sounds: Normal breath sounds  No wheezing, rhonchi or rales  Genitourinary:     Comments: +Fish  Skin:     General: Skin is warm and dry  Neurological:      General: No focal deficit present  Mental Status: She is alert and oriented to person, place, and time  Mental status is at baseline     Psychiatric:         Mood and Affect: Mood and affect normal          Speech: Speech normal          Behavior: Behavior normal        Laboratory and Diagnostics:  Results from last 7 days   Lab Units 12/02/22 0522 12/01/22 0424 11/30/22 2220 11/30/22 1901 11/30/22  1701 11/30/22  1456 11/30/22  1207   WBC Thousand/uL 11 09* 14 43* 13 73*  --   --   --   --    HEMOGLOBIN g/dL 8 4* 9 2* 9 8*  --   --   --   --    I STAT HEMOGLOBIN g/dl  --   --   --  10 2* 10 2* 10 5* 12 2   HEMATOCRIT % 27 0* 29 7* 31 1*  --   --   --   --    HEMATOCRIT, ISTAT %  --   --   --  30* 30* 31* 36   PLATELETS Thousands/uL 121* 128* 127*  --   --   --   --    NEUTROS PCT % 73  --   --   --   --   --   --    MONOS PCT % 9  --   --   --   --   --   --      Results from last 7 days   Lab Units 12/02/22 0522 12/01/22 0424 11/30/22 2220 11/30/22  1901 11/30/22  1701 11/30/22  1456 11/30/22  1207   SODIUM mmol/L 140 140 138  --   --   --   --    POTASSIUM mmol/L 3 9 3 8 4 1  --   -- --   --    CHLORIDE mmol/L 110* 110* 109*  --   --   --   --    CO2 mmol/L 24 24 21  --   --   --   --    CO2, I-STAT mmol/L  --   --   --  23 24 24 25   ANION GAP mmol/L 6 6 8  --   --   --   --    BUN mg/dL 12 10 10  --   --   --   --    CREATININE mg/dL 1 01 1 10 1 35*  --   --   --   --    CALCIUM mg/dL 7 7* 7 3* 7 7*  --   --   --   --    GLUCOSE RANDOM mg/dL 146* 161* 192*  --   --   --   --      Results from last 7 days   Lab Units 11/30/22  2220   MAGNESIUM mg/dL 2 0   PHOSPHORUS mg/dL 3 2               Results from last 7 days   Lab Units 12/01/22  0136 11/30/22  2220   LACTIC ACID mmol/L 2 0 3 9*     ABG:  Results from last 7 days   Lab Units 11/30/22  2219   PH ART  7 345*   PCO2 ART mm Hg 36 8   PO2 ART mm Hg 101 1   HCO3 ART mmol/L 19 6*   BASE EXC ART mmol/L -5 5   ABG SOURCE  Line, Arterial     Intake and Output  I/O       12/01 0701  12/02 0700 12/02 0701  12/03 0700    P  O  1780 1620    I V  (mL/kg) 1933 3 (22 4) 530 (6 1)    NG/GT  0    IV Piggyback 150 50    Total Intake(mL/kg) 3863 3 (44 8) 2200 (25 5)    Urine (mL/kg/hr) 1290 (0 6) 1450 (0 7)    Drains 230 165    Stool 0 0    Total Output 1520 1615    Net +2343 3 +585          Unmeasured Stool Occurrence 0 x 0 x        Height and Weights   Height: 5' 3" (160 cm)     Body mass index is 33 66 kg/m²  Weight (last 2 days)     None        Nutrition       Diet Orders   (From admission, onward)             Start     Ordered    12/01/22 0843  Diet Regular; Regular House  Diet effective now        References:    Nutrtion Support Algorithm Enteral vs  Parenteral   Question Answer Comment   Diet Type Regular    Regular Regular House    RD to adjust diet per protocol?  No        12/01/22 0844              Active Medications  Scheduled Meds:  Current Facility-Administered Medications   Medication Dose Route Frequency Provider Last Rate   • acetaminophen  975 mg Oral HOSP ANTONI DE HATO VIKAS Remus Six, PANIXON     • aspirin  325 mg Oral Daily Remus Six, KOFFI     • citalopram  20 mg Oral QAM Stefany Monk PA-C     • cyclobenzaprine  10 mg Oral TID Stefany Monk PA-C     • diphenhydrAMINE  25 mg Intravenous Q6H PRN Stefany Monk PA-C     • Doxepin HCl  6 mg Oral HS [place] KOFFI Monk     • enoxaparin  40 mg Subcutaneous Daily Stefany Monk, KOFFI     • gabapentin  300 mg Oral TID Stefany Monk PA-C     • HYDROmorphone  0 5 mg Intravenous Q4H PRN Stefany Monk PA-C     • insulin lispro  1-6 Units Subcutaneous TID AC Denis Petit PA-C     • labetalol  10 mg Intravenous Q6H PRN Katina KOFFI Latham     • ondansetron  4 mg Intravenous Q6H PRN Stefany Monk PA-C     • oxyCODONE  2 5 mg Oral Q4H PRN Katina KOFFI Latham      Or   • oxyCODONE  5 mg Oral Q4H PRN Katina Latham PA-C     • pantoprazole  20 mg Oral Early Morning Stefany Monk PA-C     • polyethylene glycol  17 g Oral Daily PRN Denis Petit PA-C     • senna-docusate sodium  1 tablet Oral HS Denis Petit PA-C       Continuous Infusions:     PRN Meds:   diphenhydrAMINE, 25 mg, Q6H PRN  HYDROmorphone, 0 5 mg, Q4H PRN  labetalol, 10 mg, Q6H PRN  ondansetron, 4 mg, Q6H PRN  oxyCODONE, 2 5 mg, Q4H PRN   Or  oxyCODONE, 5 mg, Q4H PRN  polyethylene glycol, 17 g, Daily PRN      Allergies   Allergies   Allergen Reactions   • Pollen Extract Other (See Comments)     ---------------------------------------------------------------------------------------  Care Time Delivered:   No Critical Care time spent     Mor Nunez PA-C    Portions of the record may have been created with voice recognition software  Occasional wrong word or "sound a like" substitutions may have occurred due to the inherent limitations of voice recognition software    Read the chart carefully and recognize, using context, where substitutions have occurred

## 2022-12-03 NOTE — DISCHARGE INSTRUCTIONS
Breast Reconstruction with Tissue    Keep LEONARDO dressings on until seen by Dr Chandrakant Valdez  If the light is blinking green, the battery pack is functioning properly  If the light blinks orange, hit the orange button to re-establish the seal, this will usually correct the problem, if the light continues to blink orange, the dressing will still continue to function  Call the office if the dressing becomes completely saturated  2  Milk the drain tubing using an alcohol wipe and empty the bulbs every 8 hours or as necessary as shown (uncap, drain fluid, record amount, discard, squeeze air out, recap)  The drains will typically stay in 1-2 weeks post-operatively  Call the office once the drains are less than 30mL in a 24 hour period for removal  Keep surgical bra on at all times, ok to place ABD pads between LEONARDO dressings and bra for comfort, ok to open bra for comfort while in bed for short breaks  3         The binder in place until seen by Dr Rhona Nina may remove the binder while at rest for short intervals for comfort only, otherwise the binder should be worn at all times  Place ABD pads under the binder for comfort at pressure point  4  Call Dr Chandrakant Valdez immediately if there is any change to breast skin including:  Increase in swelling, bruising, or discoloration  Increase in pain on one side vs  the other  Change in color of the skin including, purplish or dark areas  Skin color that becomes white or pale          All Surgeries    1  You must be off all pain medications and have a clear field of vision before driving    2  For the next 24 hours:    a  Do not sign any legal documents or operate machinery  b  Have a responsible adult help you     c  Take it easy & rest     3  Clear liquids first, if no nausea, progress to soft diet, and then regular diet as tolerated  4  Take medications as ordered, and do not take any pain medication on an empty stomach   Once pain meds are finished you may alternate Tylenol & Advil as directed for pain    a  Make sure to take all antibiotics until completion    b  If lovenox or heparin was prescribed, use as directed until completion    5  Avoid alcoholic beverages  6  Resume any prior medications at home unless otherwise instructed by Dr Margarette Carter  7  Call Dr Margarette Carter at the office [phone] if:     a  Obvious bleeding, excessive swelling, and tenderness    b  Hardness of incision on palpation    c  Fever over 101 0°    d  Shortness of breath, severe calf or thigh pain  e  Redness, odor, or pus at the wound {some oozing is normal from incision sites and may  continue for several days ABD pads or feminine pads can be used for absorption )    f  Persistent vomiting or pain that is not relieved by your medication  g   To make your follow-up appointment , ask a question, or report a problem

## 2022-12-03 NOTE — PROGRESS NOTES
Patient seen and examined  No acute events  Vioptix yesterday and today are much improved  Now with signals consistently in the 40s  Patient states that her pain is well controlled  She has been out of bed  /72   Pulse 70   Temp 99 1 °F (37 3 °C) (Probe)   Resp 19   Ht 5' 3" (1 6 m)   Wt 86 2 kg (190 lb)   SpO2 94%   BMI 33 66 kg/m²       PE-- flaps pink, warm, well perfused  No evidence of venous congestion  Ecchymosis has improved  Overall flaps appear much better perfused  Abdomen- soft, minimally tender, non distended, LEONARDO dressings intact    Discussion- discussed with the patient that she is doing very well  Her perfusion to the flaps have improved significantly  Patient stable for transfer to the floor  Likely discharge home in am if stable  Patient understands and agrees      Plan  Stable for transfer to floor  Continue to monitor flaps  OOB  LEONARDO  Drains  Likely discharge in am  Will order home health

## 2022-12-04 VITALS
BODY MASS INDEX: 33.66 KG/M2 | TEMPERATURE: 97.6 F | HEART RATE: 74 BPM | RESPIRATION RATE: 18 BRPM | DIASTOLIC BLOOD PRESSURE: 80 MMHG | OXYGEN SATURATION: 94 % | HEIGHT: 63 IN | WEIGHT: 190 LBS | SYSTOLIC BLOOD PRESSURE: 148 MMHG

## 2022-12-04 LAB
ANION GAP SERPL CALCULATED.3IONS-SCNC: 6 MMOL/L (ref 4–13)
BUN SERPL-MCNC: 12 MG/DL (ref 5–25)
CALCIUM SERPL-MCNC: 8.2 MG/DL (ref 8.3–10.1)
CHLORIDE SERPL-SCNC: 110 MMOL/L (ref 96–108)
CO2 SERPL-SCNC: 21 MMOL/L (ref 21–32)
CREAT SERPL-MCNC: 0.87 MG/DL (ref 0.6–1.3)
ERYTHROCYTE [DISTWIDTH] IN BLOOD BY AUTOMATED COUNT: 14.6 % (ref 11.6–15.1)
GFR SERPL CREATININE-BSD FRML MDRD: 67 ML/MIN/1.73SQ M
GLUCOSE SERPL-MCNC: 100 MG/DL (ref 65–140)
GLUCOSE SERPL-MCNC: 108 MG/DL (ref 65–140)
GLUCOSE SERPL-MCNC: 110 MG/DL (ref 65–140)
GLUCOSE SERPL-MCNC: 156 MG/DL (ref 65–140)
HCT VFR BLD AUTO: 25.8 % (ref 34.8–46.1)
HGB BLD-MCNC: 8 G/DL (ref 11.5–15.4)
MCH RBC QN AUTO: 28.4 PG (ref 26.8–34.3)
MCHC RBC AUTO-ENTMCNC: 31 G/DL (ref 31.4–37.4)
MCV RBC AUTO: 92 FL (ref 82–98)
PLATELET # BLD AUTO: 143 THOUSANDS/UL (ref 149–390)
PMV BLD AUTO: 11.4 FL (ref 8.9–12.7)
POTASSIUM SERPL-SCNC: 4 MMOL/L (ref 3.5–5.3)
RBC # BLD AUTO: 2.82 MILLION/UL (ref 3.81–5.12)
SODIUM SERPL-SCNC: 137 MMOL/L (ref 135–147)
WBC # BLD AUTO: 7.54 THOUSAND/UL (ref 4.31–10.16)

## 2022-12-04 RX ORDER — CYCLOBENZAPRINE HCL 10 MG
10 TABLET ORAL 3 TIMES DAILY
Qty: 30 TABLET | Refills: 0 | Status: SHIPPED | OUTPATIENT
Start: 2022-12-04

## 2022-12-04 RX ORDER — ASPIRIN 325 MG
325 TABLET ORAL DAILY
Qty: 30 TABLET | Refills: 0 | Status: SHIPPED | OUTPATIENT
Start: 2022-12-05

## 2022-12-04 RX ORDER — CEPHALEXIN 500 MG/1
500 CAPSULE ORAL EVERY 6 HOURS SCHEDULED
Qty: 28 CAPSULE | Refills: 0 | Status: SHIPPED | OUTPATIENT
Start: 2022-12-04 | End: 2022-12-11

## 2022-12-04 RX ORDER — GABAPENTIN 300 MG/1
300 CAPSULE ORAL 3 TIMES DAILY
Qty: 60 CAPSULE | Refills: 0 | Status: SHIPPED | OUTPATIENT
Start: 2022-12-04

## 2022-12-04 RX ORDER — ACETAMINOPHEN 325 MG/1
975 TABLET ORAL EVERY 6 HOURS SCHEDULED
Qty: 30 TABLET | Refills: 1 | Status: SHIPPED | OUTPATIENT
Start: 2022-12-04

## 2022-12-04 RX ORDER — ENOXAPARIN SODIUM 100 MG/ML
40 INJECTION SUBCUTANEOUS DAILY
Qty: 2.8 ML | Refills: 0 | Status: SHIPPED | OUTPATIENT
Start: 2022-12-05 | End: 2022-12-12

## 2022-12-04 RX ORDER — OXYCODONE HYDROCHLORIDE 5 MG/1
5 TABLET ORAL EVERY 6 HOURS PRN
Qty: 20 TABLET | Refills: 0 | Status: SHIPPED | OUTPATIENT
Start: 2022-12-04 | End: 2022-12-14

## 2022-12-04 RX ADMIN — PANTOPRAZOLE SODIUM 20 MG: 20 TABLET, DELAYED RELEASE ORAL at 05:25

## 2022-12-04 RX ADMIN — CITALOPRAM HYDROBROMIDE 20 MG: 20 TABLET ORAL at 09:27

## 2022-12-04 RX ADMIN — ASPIRIN 325 MG ORAL TABLET 325 MG: 325 PILL ORAL at 09:27

## 2022-12-04 RX ADMIN — ENOXAPARIN SODIUM 40 MG: 40 INJECTION SUBCUTANEOUS at 09:27

## 2022-12-04 RX ADMIN — GABAPENTIN 300 MG: 300 CAPSULE ORAL at 09:27

## 2022-12-04 RX ADMIN — ACETAMINOPHEN 975 MG: 325 TABLET ORAL at 05:25

## 2022-12-04 RX ADMIN — CYCLOBENZAPRINE HYDROCHLORIDE 10 MG: 10 TABLET, FILM COATED ORAL at 09:27

## 2022-12-04 NOTE — PROGRESS NOTES
Patient without complaints  Patient states that she is doing well  Pain is controlled  She has been out of bed  No acute events per nursing  /80   Pulse 74   Temp 97 6 °F (36 4 °C) (Oral)   Resp 18   Ht 5' 3" (1 6 m)   Wt 86 2 kg (190 lb)   SpO2 94%   BMI 33 66 kg/m²       Vioptix continues to improve    Bilateral flaps, pink, warm, well perfused, some epidermolysis around edges of flaps, no evidence of congestion, ischemia or fluid collections    Abdomen- soft, minimally tender, non distended, no evidence of hematoma or seroma    Discussion- discussed with patient that she is doing very well  Discussed with her that she is stable for discharge  Patient agrees  Will discharge home today

## 2022-12-05 NOTE — UTILIZATION REVIEW
NOTIFICATION OF ADMISSION DISCHARGE   This is a Notification of Discharge from [facility]  Please be advised that this patient has been discharge from our facility  Below you will find the admission and discharge date and time including the patient’s disposition  UTILIZATION REVIEW CONTACT:  Mar December  Utilization   Network Utilization Review Department  Phone: [phone] x carefully listen to the prompts  All voicemails are confidential   Email: [email or web address]     ADMISSION INFORMATION  PRESENTATION DATE: 11/30/2022  6:16 AM  OBERVATION ADMISSION DATE:  INPATIENT ADMISSION DATE: 11/30/22  2:02 PM   DISCHARGE DATE: 12/4/2022  5:06 PM   DISPOSITION:Home/Self Care    IMPORTANT INFORMATION:  Send all requests for admission clinical reviews, approved or denied determinations and any other requests to dedicated fax number below belonging to the campus where the patient is receiving treatment   List of dedicated fax numbers:  [address] DENIALS (Administrative/Medical Necessity) [phone]   [address] [place] [address] (Maternity/NICU/Pediatrics) [phone]   [facility] [phone]   [facility] [phone]   [address] [phone]   [address] [facility] [phone] 90 [facility] [phone]   [address] [place] [address] [phone]   [facility]  [phone]   [facility] [phone]   412 [facility] 850 [facility] [phone]

## 2022-12-06 NOTE — OP NOTE
OPERATIVE REPORT  PATIENT NAME: Christiano Munroe    :  1950  MRN: [id number]  Pt Location: [facility] OR ROOM 15    SURGERY DATE: 2022    Surgeon(s) and Role:     * Nydia Yanes MD - Primary     * Joe Perales - Assisting     * Gordon Love MD     * Susie Guerra MD- cosurgeon    Preop Diagnosis:  First stage breast reconstruction s/p tissue expander placement    Post-operative diagnosis:  Same      Procedure-- second stage,, bilateral breast reconstruction with bilateral D IEP free tissue transfer, use of spy laser angiography for assessment of intraoperative flow, placement of kellie None DME negative pressure wound therapy dressing less than 50 cm²,    Modifier 22 should be used for increased time, and difficulty required for  based dissection of free tissue transfer  Specimen(s):  ID Type Source Tests Collected by Time Destination   1 : left breast skin Tissue Breast, Left TISSUE EXAM Nydia Yanes MD 2022  9:39 AM    2 : left breast tissue expander Other Foreign body TISSUE EXAM Nydia Yanes MD 2022  9:46 AM    3 : left breast capsule  Tissue Breast, Left TISSUE EXAM Nydia Yanes MD 2022  9:59 AM    4 : right mastectomy scar Tissue Breast, Right TISSUE EXAM Nydia Yanes MD 2022 11:39 AM    5 : right breast expander  Other Foreign body TISSUE EXAM Nydia Yanes MD 2022 11:48 AM    6 : RIGHT BREAST CAPSULE Tissue Breast, Right TISSUE EXAM Nydia Yanes MD 2022 12:02 PM        Estimated Blood Loss:   350 mL    Drains:  Closed/Suction Drain Lateral;Left LLQ Bulb 19 Fr  (Active)   Site Description Unable to view 22   Dressing Status Clean;Dry; Intact 22   Drainage Appearance Serosanguineous 22   Status To bulb suction 22   Output (mL) 20 mL 22   Number of days: 6       Closed/Suction Drain Lateral;Left Breast Bulb 15 Fr   (Active)   Site Description Unable to view 12/04/22 0900   Dressing Status Clean;Dry; Intact 12/04/22 0900   Drainage Appearance Serosanguineous 12/04/22 0900   Status To bulb suction 12/04/22 0900   Intake (mL) 0 mL 12/02/22 2001   Output (mL) 5 mL 12/04/22 0900   Number of days: 6       Closed/Suction Drain Inferior; Lateral;Right Breast Bulb 15 Fr  (Active)   Site Description Unable to view 12/04/22 0900   Dressing Status Clean;Dry; Intact 12/04/22 0900   Drainage Appearance Serosanguineous 12/04/22 0900   Status To bulb suction 12/04/22 0900   Output (mL) 5 mL 12/04/22 0900   Number of days: 6       Closed/Suction Drain Lateral RLQ Bulb 19 Fr  (Active)   Site Description Unable to view 12/04/22 0900   Dressing Status Clean;Dry; Intact 12/04/22 0900   Drainage Appearance Bloody 12/04/22 0900   Status To bulb suction 12/04/22 0900   Output (mL) 20 mL 12/04/22 0900   Number of days: 6       [REMOVED] NG/OG/Enteral Tube Orogastric 18 Fr Center mouth (Removed)   Number of days: 0       [REMOVED] Urethral Catheter Latex; Temperature probe 16 Fr   (Removed)   Reasons to continue Urinary Catheter  Accurate I&O assessment in critically ill patients (48 hr  max) 12/03/22 1915   Goal for Removal Remove after 48 hrs of I/O monitoring 12/03/22 1915   Site Assessment Clean;Skin intact 12/03/22 1915   Fish Care Done 12/03/22 2011   Collection Container Standard drainage bag 12/03/22 1915   Securement Method Securing device (Describe) 12/03/22 1915   Output (mL) 125 mL 12/03/22 2011   Number of days: 4       Anesthesia Type:   General    Operative Indications:  History of breast reconstruction [Z98 [id number]]  As above    Operative Findings:  Bilateral low flow state  despite open anastomosis    Complications:   None    Procedure and Technique:    Patient is a 72-year-old female with history of bilateral first stage breast reconstruction, the patient is known to me from my previous practice, she was initially set for bilateral free tissue transfer, however because of social circumstances and the COVID-19 pandemic, the patient required significant delay of her final stage reconstruction  After period of time, the patient was deemed ready for free tissue transfer and second stage breast reconstruction  We had a lengthy discussion regarding the nature of breast reconstruction, nature of free tissue transfer and microsurgery, the risks of bleeding, infection, scarring, poor wound healing, damage to surrounding and underlying structures, need for further surgery, need for multiple procedures, possibility of free t tissue failure, microvascular failure,  microvascular failure, need for emergent surgery, seroma, DVT, poor aesthetic result, the need for multiple surgeries, the need for multiple procedures, aesthetic result, the need for intensive care admission, the use of kellie, and use of drains, all the patient's questions were answered to her satisfaction, informed consent obtained and signed and the patient was brought to the hospital as an outpatient elective basis to have the procedures performed  All the patient's questions were answered to her satisfaction, informed consent obtained and signed and the patient was brought to the hospital as an outpatient elective basis to have the procedures performed  The patient underwent CT angiography, at that point we were able to pinpoint the perforators on the abdominal wall, this was done with a hand-held Doppler preoperative holding area  The patient was given preoperative antibiotics, her SCDs were activated and she was given Lovenox prior to the induction of anesthesia  She is marked in the preoperative holding area, and then brought to the operating room where she was identified verbally, by site, and by armband in the operating room table prior to the induction of anesthesia    After the induction of anesthesia was prepped and draped in the standard surgical fashion, timeout performed and the surgical site identified  This operative report will reflect the elevation of the left hemiabdomen and right microvascular anastomosis, please refer to Dr Nataly Coyle and Dr Laine العلي notes for elevation of the right hemiabdomen flap as well as a capsulectomy and exposure of the microvascular vessels  Dr Alissa Martinez performed this surgery as a co-surgeon  A co-surgeon was required as there were multiple surgical sites for the bilateral procedure to decrease anesthesia time for the patient and microsurgical expertise required for  based dissection and microsurgical anastomosis  At this point attention was begun on the left hemiabdomen where an incision was made in the inferior umbilical crease, dissection was taken down through the subcuticular and subcutaneous layer where the superficial epigastric vein was identified  Meticulous dissection was performed in order to epigastric vein was identified  Meticulous dissection was performed in order to dissect this vessel out for several centimeters, once this was performed, the flap began elevation from the lateral to medial   This was done by Bovie electrocautery and this was done by Bovie electrocautery and bipolar cautery at the level of the rectus fascia once the lateral border of the rectus muscle was encountered, Bovie was turned to a low setting and dissection proceeded slowly meticulously  A large dominant  that was based in good position in the middle of the flap was identified in the lateral row as well as several medial row perforators  Because of the size and location of the lateral row, the decision was made to base the flap on the lateral row     Once this was decided, a incision was made in the fascia and the  meticulously dissected free, once the  was freed from the fascia, meticulous dissection through the muscular course with Bovie and bipolar cautery was performed under loupe magnification in conjunction with Dr Latia Bonilla  Once the  was I dissected through the muscle layer, it was dissected down to the origin of the deep inferior epi gastric vessels  Once sufficient length and caliber was obtained at the origin, the deep inferior epigastric pedicle was clipped and ischemia time started  Prior to this the spine laser angiography system was used to assess flow  Was found patient had adequate flow although had some decreased perfusion and was felt to be viable and suitable for free tissue transfer  The flap was then transferred to the chest, and sutured into place and anchored  The flap was then anastomosed to the inframammary vessels using standard micro surgical technique under microscope magnification with multiple millimeter  of the sutures in simple interrupted fashion  Once the arterial anastomosis was performed, a sizer  was used in order to determine the size of the venous anastomosis  At this point a 2 5 was chosen and the  was used to perform the venous anastomosis  Once this was performed the flap was assessed for flow, there was adequate inflow and the anastomosis appeared patent  Spy laser angiography system was then used in order to assess the flow, the patient was found to have very sluggish flow although patent anastomosis was visible, it was felt that the flap would be viable, and the decision was made at that time to proceed with closure  Should be noted that the flap was anastomosed and inset in the exact same fashion on the left  The right Jose Carlos abdominal flap was harvested while the microvascular anastomosis was being performed  Once this was performed, the flap was de-epithelialized leaving a skin paddle for assessment, the patient was then set into a flexed position, the skin flaps were raised once again at the level of the rectus fascia to the level of the left subcostal margin and xiphoid    The flaps were mobilized and advanced, Exparel long-acting local Celia Angle was performed and a tap block throughout the fascia, Vistaseal fibrin glue was infiltrated in the wound and [address] drains were placed  The flap was then anchored with [address] drains were placed  The flap was then anchored with 0 Vicryl suture in the midline and off to either side of the midline and a #1 strata fix was used to close the superficial fascial system  Once this was performed a 3 oh strata fix was used to close the deep dermal and subcuticular layer  An elliptical incision was made at the level of the ASIS or the iliac crest with bisect the needle umbilicus, an elliptical incision was used to excise the tissue and the abdominal flap and create a medial umbilicus which was inset with 3-0 Monocryl and a 5-0 nylon  Because of the patient's body habitus and high risk of wound healing complication 8 kellie none DME negative pressure wound therapy dressing was applied  Once the flaps were inset, the spy laser angiography system was used, it was once again found the patient had very poor perfusion particularly on the right side  At this point the microscope was once again brought him in anastomosis was inspected  Using Doppler, visual inspection and spy laser angiography the patient was found to have a patent anastomosis and no further surgical intervention was required at that point  The decision was made to reclose the flap, a bioptic's probe was placed and good flow was confirmed in the flap  At this point the patient was extubated and in stable condition sent to the ICU, there were no complications, we will monitor her status in the intensive care unit       I was present for the entire procedure    Patient Disposition:  PACU         SIGNATURE: Alise Morales MD  DATE: December 6, 2022  TIME: 12:25 PM

## 2022-12-07 ENCOUNTER — OFFICE VISIT (OUTPATIENT)
Dept: PLASTIC SURGERY | Facility: CLINIC | Age: 72
End: 2022-12-07

## 2022-12-07 VITALS
DIASTOLIC BLOOD PRESSURE: 78 MMHG | HEIGHT: 63 IN | HEART RATE: 74 BPM | BODY MASS INDEX: 33.66 KG/M2 | TEMPERATURE: 98.4 F | WEIGHT: 190 LBS | OXYGEN SATURATION: 93 % | RESPIRATION RATE: 20 BRPM | SYSTOLIC BLOOD PRESSURE: 132 MMHG

## 2022-12-07 DIAGNOSIS — Z98.890 S/P BREAST RECONSTRUCTION, BILATERAL: Primary | ICD-10-CM

## 2022-12-07 NOTE — PROGRESS NOTES
Subjective:    Patient ID: Pk Perry is a 81 y o  female  HPI Lillie Uma presents today for post-op follow up s/p second stage bilateral breast reconstruction with bilateral LIVE free tissue transfer, use of spy laser angiography for assessment of intraoperative flow, placement of kellie None DME negative pressure wound therapy dressing less than 50 cm² on 11/30/22  The patient's postoperative course has been uncomplicated  The patient is doing well overall today, she reports some postoperative discomfort but denies any sever pain  She denies any fevers, chills, swelling, bleeding, or drainage  She reports all of her drains are putting out very minimal output, with the exception of her right lower abdominal drain, which is still putting out >50cc daily  She has been wearing compression on her abdomen and breasts, following restrictions, and taking postoperative medications as prescribed  She states flexeril has been making her feel groggy        Past Medical History:   Diagnosis Date   • Cancer ([facility] )    • CPAP (continuous positive airway pressure) dependence    • Depression    • GERD (gastroesophageal reflux disease)    • Restless leg syndrome    • Sleep apnea        Patient Active Problem List   Diagnosis   • Chronic kidney disease, stage 3a (HCC)   • Prediabetes   • Obesity   • GERD (gastroesophageal reflux disease)   • CPAP (continuous positive airway pressure) dependence   • Admission for breast reconstruction following mastectomy   • Breast cancer ([place] [address] )   • S/P breast reconstruction, bilateral         Current Outpatient Medications:   •  acetaminophen (TYLENOL) 325 mg tablet, Take 3 tablets (975 mg total) by mouth every 6 (six) hours, Disp: 30 tablet, Rfl: 1  •  aspirin 325 mg tablet, Take 1 tablet (325 mg total) by mouth daily Do not start before December 5, 2022 , Disp: 30 tablet, Rfl: 0  •  Biotin 5 MG TABS, Take 5,000 mcg by mouth every morning, Disp: , Rfl:   •  cephalexin (KEFLEX) 500 mg capsule, Take 1 capsule (500 mg total) by mouth every 6 (six) hours for 7 days, Disp: 28 capsule, Rfl: 0  •  citalopram (CeleXA) 20 mg tablet, Take 20 mg by mouth every morning, Disp: , Rfl:   •  cyclobenzaprine (FLEXERIL) 10 mg tablet, Take 1 tablet (10 mg total) by mouth 3 (three) times a day, Disp: 30 tablet, Rfl: 0  •  Doxepin HCl 6 MG TABS, Take 6 mg by mouth daily at bedtime, Disp: , Rfl:   •  Elastic Bandages & Supports (B-3 Extra High Comp Hose Women) MISC, Use, Disp: , Rfl:   •  enoxaparin (LOVENOX) 40 mg/0 4 mL, Inject 0 4 mL (40 mg total) under the skin daily for 7 days Do not start before December 5, 2022 , Disp: 2 8 mL, Rfl: 0  •  fluticasone (FLONASE) 50 mcg/act nasal spray, 1 spray into each nostril if needed, Disp: , Rfl:   •  gabapentin (NEURONTIN) 300 mg capsule, Take 1 capsule (300 mg total) by mouth 3 (three) times a day, Disp: 60 capsule, Rfl: 0  •  Multiple Vitamin (Multi Vitamin Daily) TABS, every morning, Disp: , Rfl:   •  omeprazole (PriLOSEC) 20 mg delayed release capsule, Take 20 mg by mouth every morning, Disp: , Rfl:   •  oxyCODONE (ROXICODONE) 5 immediate release tablet, Take 1 tablet (5 mg total) by mouth every 6 (six) hours as needed for severe pain for up to 10 days Max Daily Amount: 20 mg, Disp: 20 tablet, Rfl: 0  •  Cholecalciferol 25 MCG (1000 UT) capsule, Take 1,000 Units by mouth every morning, Disp: , Rfl:     Past Surgical History:   Procedure Laterality Date   • BILATERAL OOPHORECTOMY     • ELBOW FRACTURE SURGERY     • MASTECTOMY     • VA BREAST RECONSTRUCTION W/FREE FLAP Bilateral 11/30/2022    Procedure: RECONSTRUCTION BREAST W/FLAP FREE DEEP INFERIOR EPIGASTRIC  (LIVE);   Surgeon: Carlos Gamble MD;  Location: [facility];  Service: Plastics   • RHINOPLASTY     • SINUS SURGERY         Social History     Tobacco Use   • Smoking status: Never   • Smokeless tobacco: Never   Substance Use Topics   • Alcohol use: Yes     Comment: occasionally       Objective:  /78 Pulse 74   Temp 98 4 °F (36 9 °C)   Resp 20   Ht 5' 3" (1 6 m)   Wt 86 2 kg (190 lb)   SpO2 93%   BMI 33 66 kg/m²      Physical Exam    General: NAD, alert    Breasts: Incisions of bilateral breasts are clean, dry, and intact  Staples intact  Flaps are soft, warm, with capillary refill, however, around perimeter of flaps, there is blistering/epidermolysis present  No significant wounds, will continue to monitor  No evidence of hematoma formation  No signs of infection  B/l breast drains removed without difficulty  Bacitracin and gauze applied the drain sites  ABDs and surgical bra were applied      Abdomen: Incision of abdomen is clean, dry, and intact  Umbilicus with epidermolysis, will continue to monitor  There is no evidence of surrounding erythema, signs of infection, or wound breakdown  Abdomen is protuberant at baseline  There is no evidence of hematoma or seroma formation  Abdominal incision healing well  Left drain removed without difficulty  Bacitracin and gauze applied the drain site  ABDs and abdominal binder applied  Assessment/Plan: 81year old female s/p s/p second stage bilateral breast reconstruction with bilateral LIVE free tissue transfer, use of spy laser angiography for assessment of intraoperative flow, placement of kellie None DME negative pressure wound therapy dressing less than 50 cm²      Patient has 1 drain remaining in right abdomen  Discussed the continuation of abdominal binder, surgical bra, and following activity restrictions  Patient may shower tmrw  Begin application of antibiotic ointment to all incisions 2x daily  We will see patient back in 1 week  Patient also seen by Dr Lilian Jaramillo, he is aware of epidermolysis around periphery of b/l breast flaps and umbilicus         Eren Gruber PA-C  Plastic & Reconstructive Surgery

## 2022-12-08 NOTE — OP NOTE
OPERATIVE REPORT  PATIENT NAME: Buster Walker    :  1950  MRN: [id number]  Pt Location: [facility] OR ROOM 15    SURGERY DATE: 2022    Surgeon(s) and Role:     * Lydia Stephens MD - Primary     * Devan Rondon PA-C - Assisting     * Sandrita Fish MD-Assisting     * Nii Yarbrough MD- Co-Surgeon    Preop Diagnosis:  History of breast reconstruction [Z98 890]    Post-Op Diagnosis Codes:     * History of breast reconstruction [Z98 890]    Procedure(s) (LRB):  RECONSTRUCTION BREAST W/FLAP FREE DEEP INFERIOR EPIGASTRIC  (LIVE) (Bilateral)    Specimen(s):  ID Type Source Tests Collected by Time Destination   1 : left breast skin Tissue Breast, Left TISSUE EXAM Lydia Stephens MD 2022 3209    2 : left breast tissue expander Other Foreign body TISSUE EXAM Lydia Stephens MD 2022 6823    3 : left breast capsule  Tissue Breast, Left TISSUE EXAM Lydia Stephens MD 2022 0539    4 : right mastectomy scar Tissue Breast, Right TISSUE EXAM Lydia Stephens MD 2022 1139    5 : right breast expander  Other Foreign body TISSUE EXAM Lydia Stephens MD 2022 1148    6 : RIGHT BREAST CAPSULE Tissue Breast, Right TISSUE EXAM Lydia Stephens MD 2022 1202        Estimated Blood Loss:   350 mL    Drains:  Closed/Suction Drain Lateral;Left LLQ Bulb 19 Fr  (Active)   Site Description Unable to view 22   Dressing Status Clean;Dry; Intact 22   Drainage Appearance Serosanguineous 22   Status To bulb suction 22   Output (mL) 20 mL 22   Number of days: 7       Closed/Suction Drain Lateral;Left Breast Bulb 15 Fr  (Active)   Site Description Unable to view 22   Dressing Status Clean;Dry; Intact 22   Drainage Appearance Serosanguineous 22   Status To bulb suction 22   Intake (mL) 0 mL 22   Output (mL) 5 mL 22   Number of days: 7 Closed/Suction Drain Inferior; Lateral;Right Breast Bulb 15 Fr  (Active)   Site Description Unable to view 12/04/22 0900   Dressing Status Clean;Dry; Intact 12/04/22 0900   Drainage Appearance Serosanguineous 12/04/22 0900   Status To bulb suction 12/04/22 0900   Output (mL) 5 mL 12/04/22 0900   Number of days: 7       Closed/Suction Drain Lateral RLQ Bulb 19 Fr  (Active)   Site Description Unable to view 12/04/22 0900   Dressing Status Clean;Dry; Intact 12/04/22 0900   Drainage Appearance Bloody 12/04/22 0900   Status To bulb suction 12/04/22 0900   Output (mL) 20 mL 12/04/22 0900   Number of days: 7       [REMOVED] NG/OG/Enteral Tube Orogastric 18 Fr Center mouth (Removed)   Number of days: 0       [REMOVED] Urethral Catheter Latex; Temperature probe 16 Fr  (Removed)   Reasons to continue Urinary Catheter  Accurate I&O assessment in critically ill patients (48 hr  max) 12/03/22 1915   Goal for Removal Remove after 48 hrs of I/O monitoring 12/03/22 1915   Site Assessment Clean;Skin intact 12/03/22 1915   Fish Care Done 12/03/22 2011   Collection Container Standard drainage bag 12/03/22 1915   Securement Method Securing device (Describe) 12/03/22 1915   Output (mL) 125 mL 12/03/22 2011   Number of days: 4       Anesthesia Type:   General    Operative Indications:  History of breast reconstruction [Z98 890]      Operative Findings:  Bilateral low flow state  despite open anastomosis    Complications:   None    Procedure and Technique:  1  Bilateral breast reconstruction with free LIVE  flap with microvascular anastomosis, modifer 22  (Primary:Kian Co-surgeon- Syl Adorno  3  Removal of B/L tissue expanders(Assistant- Dr Medina )  4  Bilateral open capsulectomy (Assistant- Dr Medina )  3  Indocyanine green dye angiography for assessment of vascular patency  4  Application of LEONARDO NPWT dressing, 400cm2, Non-DME  Patient is a 53-year-old female with Hx of breast cancer    She desired  breast reconstruction and opted for abdominally based free tissue transfer  The risks, benefits, alternatives of surgery were discussed  Risks discussed included, but were not limited to infection, bleeding, scarring, hematoma, seroma, partial or total flap loss, venous or arterial thrombosis requiring return to the operating, damage to nerves or blood vessels, permanent numbness or hypersensitivity, loss of function, bulge or hernia, asymmetry, poor cosmesis, need for additional procedures  The patient noted her understanding  She would opportunity for questions  All were answered  She desires to proceed  Informed consent was obtained  Patient was identified in the preoperative holding area and preoperative markings were made  Patient was then transferred to operating room and transferred supine to the operating table Venodyne boots were placed and activated  All pressure points were appropriately and extensively padded  After induction of general endotracheal anesthesia, the bed was turned 180, then the entire anterior chest wall and abdomen and upper thighs were then sterilely prepped and draped with ChloraPrep in usual fashion  Dr Francesca Kessler began the approach to the bilateral chest wall with removal of B/L tissue expanders , total open capsulectomy, and the approach to the internal mammary vessels  Concurrently, Dr Katelyn Vega and I began to harvest the bilateral abdominally based flaps  First, a low transverse incision was made from ASIS to ASIS using a 10 blade scalpel  The incision was taken down through skin subcutaneous tissue using electrocautery  The bilateral superficial inferior epigastric veins were then identified and dissected out for several cm  Both sides had small and wispy vessels  Next, the superior incision was then made and taken down through skin subcutaneous tissue using electrocautery  The umbilicus was isolated using a 15 blade scalpel   The midline infraumbilical incision was made, bivalving the flaps  The right and left flaps were then dissected out, isolating the perforators at the level of the rectus fascia  The right flap fhad large medially based periumbilical   The left flap had a large lateral row   Strong audible Doppler signal and palpable pulse were noted  Therefore, a decision was made to base each flap on these  perforators  First, dissection started on the right side where a fascial incision was then made above the medial periumbilical   The  was then dissected out under loupe magnification and traced inferiorly  The  had a significant intramuscular course through the rectus muscle for 2-3 cm  The dissection was difficult and took longer than normal   Ultimately, the  was traced down to the deep inferior epigastric system  The deep inferior epigastric artery and vena comitantes were dissected out under loupe magnification and freed from adjacent tissue  The vessels were marked for orientation with a marking pen and bathed in papaverine  The flap was then secured in place with skin staples  The identical procedure was then performed on the left sided flap  The left periumbilical  had a short intramuscular course, but had a significant branching pattern, which also made the dissection more difficult and time consuming  After the perforators were dissected out, the spy system was brought onto the field and indocyanine green dye angiography was performed to assess the vascular patency and perfusion of the flaps  Delayed perfusion was demonstrated  Dr Sandrita Kc performed the approach to the bilateral internal mammary vessels  Briefly, the 4th rib and intercostal space was identified  The pectoralis major muscle divided with electrocautery to reveal the 4th costal cartilage  The costal cartilage was dissected free from adjacent tissue circumferentially    The costal cartilage was removed  The internal mammary vessels were then identified and dissected out under loupe magnification  Next the microscope was then brought into the field and attention was focused on the right side  The right-sided anastomosis was performed with myself and Dr Gayathri Pichardo  The right internal mammary vessels were then further dissected out under the microscope  The internal mammary artery and vena comitans were then clamped with Biover vascular clamps and clipped distally  The vessels were copiously area with heparinized saline and the adventitia was trimmed back  The vessels were marked for orientation with a marking pen and bathed in papaverine  Then the left-sided flap was harvested by clipping the deep inferior epigastric vessels the flap was then transferred to the right chest wall and secured in place using 2-0 Vicryl sutures  The deep inferior epigastric vessels were oriented to the right internal mammary vessels  The deep inferior epigastric vessels were dissected out under the microscope and the adventitia was trimmed back and copiously irrigated with heparinized saline  Next the deep inferior epigastric artery was anastomosed to the internal mammary artery using 9- 0 nylon in interrupted fashion  The deep inferior epigastric vein was then anastomosed to the internal mammary vein using a 3 5 mm synovitis venous  device  The  clamps were released and pulsatile flow across the anastomosis was noted with good venous return  A propagating pulse was noted  The flap demonstrated appropriate bleeding from the skin edges  The flap was then placed into the right breast pocket and stapled in place temporarily  The identical procedure was then performed on the left side by Dr Gayathri Pichardo and Dr Naima Adams  Next, the bilateral breasts were then inset into the bilateral pockets  The pockets were irrigated with normal saline and hemostasis was obtained with electrocautery  A [address] [place] [address] Joe drain was placed bilaterally  The breasts were inset with 2-0 Vicryl sutures to create medialization of the flap  The flap was inset with 3-0 Monocryl Stratafix in a running deep dermal fashion followed by 4-0 Monocryl Stratafix in a running subcuticular closure  The incision site was cleansed and dried and Dermabond was applied  Spy angiography was once again performed and confirmed continued delayed perfusion to the bilateral flaps  The abdominal donor site was then closed  First the fascial defects were then closed primarily with 1  0 running PDS Stratafix  The abdomen was copiously with normal saline hemostasis obtained with electrocautery  Two 15  [place] Joe drains were placed and brought out laterally  Jeni's fascia was then closed with 2 0 PDS Stratafix  The skin was closed in multiple layers 1st using 3-0 Monocryl Stratafix in a running deep dermal fashion followed by 4-0 Monocryl Stratafix running subcuticular closure  The umbilicus was then brought out through incision at the level of the ASIS  The umbilicus was inset with 3-0 Vicryl sutures in a buried deep dermal fashion followed by 5 0 nylon running skin closure  Prior to closure bilateral tap blocks were performed with Exparel  The incision sites were then cleansed and dried and then a kellie negative pressure wound therapy dressing, 10 x 40, dressing was applied  Total area of negative pressure wound therapy dressing was 400 centimeters squared, non DME  The bilateral breasts were then dressed with ABD pads and a surgical bra were placed  Strong audible Doppler signals were marked with 5 0 Prolene sutures  The patient tolerated the procedure well  There were no complications  I was present for the key portions of the procedure  There was no qualified resident available for the case  Dr Komal Fernandes served as an assistant for the case        A co-surgeon was required because of skills and techniques relevant to speciality, use of operating microsope   I was present for the entire procedure      Patient Disposition:  Critical Care Unit        SIGNATURE: Eloise Darby MD  DATE: December 7, 2022  TIME: 8:44 PM

## 2022-12-13 ENCOUNTER — NURSE TRIAGE (OUTPATIENT)
Dept: OTHER | Facility: OTHER | Age: 72
End: 2022-12-13

## 2022-12-14 ENCOUNTER — OFFICE VISIT (OUTPATIENT)
Dept: PLASTIC SURGERY | Facility: CLINIC | Age: 72
End: 2022-12-14

## 2022-12-14 VITALS
RESPIRATION RATE: 16 BRPM | WEIGHT: 189.2 LBS | SYSTOLIC BLOOD PRESSURE: 144 MMHG | OXYGEN SATURATION: 98 % | HEIGHT: 63 IN | TEMPERATURE: 98.2 F | HEART RATE: 73 BPM | DIASTOLIC BLOOD PRESSURE: 82 MMHG | BODY MASS INDEX: 33.52 KG/M2

## 2022-12-14 DIAGNOSIS — Z98.890 S/P BREAST RECONSTRUCTION, BILATERAL: Primary | ICD-10-CM

## 2022-12-14 NOTE — TELEPHONE ENCOUNTER
TC Dr Angelica Melo and he wanted patient to send picture through MyChart so he can review it  He will call patient back now  Appointment given to patient by Dr Renan Mcallister for 1300 tomorrow

## 2022-12-14 NOTE — TELEPHONE ENCOUNTER
Reason for Disposition  • [1] Raised bruise and [2] size > 2 inches (5 cm) and expanding    Additional Information  • Surgical incision symptoms and questions    Answer Assessment - Initial Assessment Questions  1  SYMPTOM: "What's the main symptom you're concerned about?" (e g , redness, pain, drainage)      Right breast is purple  2  ONSET: "When did purple color  start?"      1930  3  SURGERY: "What surgery was performed?"      Reconstruction of her breasts  4  DATE of SURGERY: "When was surgery performed?"       11/30/2022  5  INCISION SITE: "Where is the incision located?"       Drain was removed form that breast on  6  REDNESS: "Is there any redness at the incision site?" If yes, ask: "How wide across is the redness?" (Inches, centimeters)      None  7  PAIN: "Is there any pain?" If Yes, ask: "How bad is it?"  (Scale 1-10; or mild, moderate, severe)      None  8  BLEEDING: "Is there any bleeding?" If Yes, ask: "How much?" and "Where?"      No  9  DRAINAGE: "Is there any drainage from the incision site?" If yes, ask: "What color and how much?" (e g , red, cloudy, pus; drops, teaspoon)      No drainage  10  FEVER: "Do you have a fever?" If Yes, ask: "What is your temperature, how was it measured, and when did it start?"        No fever  11   OTHER SYMPTOMS: "Do you have any other symptoms?" (e g , shaking chills, weakness, rash elsewhere on body)        None    Protocols used: POST-OP INCISION SYMPTOMS AND QUESTIONS-ADULT-, POST-OP SYMPTOMS AND QUESTIONS-ADULT-

## 2022-12-14 NOTE — TELEPHONE ENCOUNTER
Regarding: post surgery concerns/breast recon  ----- Message from Tuyet Diaz sent at 12/13/2022  7:40 PM EST -----  "I had breast reconstruction surgery on 11/30/2022  I was using Neosporin and I noticed that my right breast is purple   I wanted to speak to someone"

## 2022-12-15 NOTE — PROGRESS NOTES
Patient is a 11year-old female who is status post bilateral D IEP  Patient has been doing well, however she was concerned as she noticed color change in her right breast   Patient states that the color turned a much more bruised and purpleish color, she is not experiencing any fever, chills, nausea, vomiting, no redness, swelling, drainage  The patient states that she has no pain in that area, her pain is otherwise well controlled, she states her drain is still putting out significant serous drainage  Physical Exam--right flap, breast skin is somewhat ecchymotic but soft, nontender, no evidence of seroma or hematoma  The skin flaps are well perfused  The skin paddle on the right flap has a somewhat ecchymotic and dusky appearance, the edges have some epidermolysis  However the flap bleeds appropriately when prepped with a 22-gauge needle, arterial blood is returned, there is no evidence of venous congestion  The left flap is pink, warm, and well-perfused, the staples in the right flap were removed without difficulty  Umbilicus is well-perfused  Incision is clean dry and intact, there is diffuse superficial scabbing  Discussion--I discussed with the patient that her right flap continues to have decreased perfusion, however the flap is viable, I discussed with the patient she may experience some skin sloughing, epidermolysis and superficial necrosis, she also may experience some deeper partial flap loss, however I did discuss with her again that it will take some time to assess the full extent, I am encouraged that overall the flap has good blood flow on a Skin- prick test I do feel that overall the flap is perfused  I discussed with her to continue local wound care, she can return if her drain is less than 30 mL in 24-hour period  For removal, overall patient is doing well

## 2022-12-19 DIAGNOSIS — Z98.890 S/P BREAST RECONSTRUCTION, BILATERAL: ICD-10-CM

## 2022-12-23 ENCOUNTER — OFFICE VISIT (OUTPATIENT)
Dept: PLASTIC SURGERY | Facility: CLINIC | Age: 72
End: 2022-12-23

## 2022-12-23 DIAGNOSIS — Z98.890 S/P BREAST RECONSTRUCTION, BILATERAL: Primary | ICD-10-CM

## 2022-12-23 NOTE — PROGRESS NOTES
Subjective:    Patient ID: Alicia Mendes is a 81 y o  female  HPI [facility] presents today for post-op follow up s/p second stage bilateral breast reconstruction with bilateral LIVE free tissue transfer, use of spy laser angiography for assessment of intraoperative flow, placement of kellie None DME negative pressure wound therapy dressing less than 50 cm² on 11/30/22  The patient reports that she is doing well this week and believes the appearance of her right breast has improved  She denies any fevers, chills, swelling, bleeding, or drainage  She reports her right lower abdominal drain is still putting out about 50cc daily  She has been wearing compression on her abdomen and breasts and following restrictions  She has been applying antibiotic ointment to all incisions           Past Medical History:   Diagnosis Date   • Cancer ([place] [id number] )    • CPAP (continuous positive airway pressure) dependence    • Depression    • GERD (gastroesophageal reflux disease)    • Restless leg syndrome    • Sleep apnea        Patient Active Problem List   Diagnosis   • Chronic kidney disease, stage 3a (HCC)   • Prediabetes   • Obesity   • GERD (gastroesophageal reflux disease)   • CPAP (continuous positive airway pressure) dependence   • Admission for breast reconstruction following mastectomy   • Breast cancer ([place] [id number] )   • S/P breast reconstruction, bilateral         Current Outpatient Medications:   •  silver sulfadiazine (SILVADENE,SSD) 1 % cream, Apply topically 2 (two) times a day for 14 days, Disp: 50 g, Rfl: 1  •  acetaminophen (TYLENOL) 325 mg tablet, Take 3 tablets (975 mg total) by mouth every 6 (six) hours, Disp: 30 tablet, Rfl: 1  •  aspirin 325 mg tablet, Take 1 tablet (325 mg total) by mouth daily Do not start before December 5, 2022 , Disp: 30 tablet, Rfl: 0  •  Biotin 5 MG TABS, Take 5,000 mcg by mouth every morning, Disp: , Rfl:   •  Cholecalciferol 25 MCG (1000 UT) capsule, Take 1,000 Units by mouth every morning, Disp: , Rfl:   •  citalopram (CeleXA) 20 mg tablet, Take 20 mg by mouth every morning, Disp: , Rfl:   •  cyclobenzaprine (FLEXERIL) 10 mg tablet, Take 1 tablet (10 mg total) by mouth 3 (three) times a day, Disp: 30 tablet, Rfl: 0  •  Doxepin HCl 6 MG TABS, Take 6 mg by mouth daily at bedtime, Disp: , Rfl:   •  Elastic Bandages & Supports (B-3 Extra High Comp Hose Women) MISC, Use, Disp: , Rfl:   •  enoxaparin (LOVENOX) 40 mg/0 4 mL, Inject 0 4 mL (40 mg total) under the skin daily for 7 days Do not start before December 5, 2022 , Disp: 2 8 mL, Rfl: 0  •  fluticasone (FLONASE) 50 mcg/act nasal spray, 1 spray into each nostril if needed, Disp: , Rfl:   •  gabapentin (NEURONTIN) 300 mg capsule, Take 1 capsule (300 mg total) by mouth 3 (three) times a day, Disp: 60 capsule, Rfl: 0  •  Multiple Vitamin (Multi Vitamin Daily) TABS, every morning, Disp: , Rfl:   •  omeprazole (PriLOSEC) 20 mg delayed release capsule, Take 20 mg by mouth every morning, Disp: , Rfl:     Past Surgical History:   Procedure Laterality Date   • BILATERAL OOPHORECTOMY     • ELBOW FRACTURE SURGERY     • MASTECTOMY     • NE BREAST RECONSTRUCTION W/FREE FLAP Bilateral 11/30/2022    Procedure: RECONSTRUCTION BREAST W/FLAP FREE DEEP INFERIOR EPIGASTRIC  (LIVE); Surgeon: Rojelio Barrett MD;  Location: [facility];  Service: Plastics   • RHINOPLASTY     • SINUS SURGERY         Social History     Tobacco Use   • Smoking status: Never   • Smokeless tobacco: Never   Substance Use Topics   • Alcohol use: Yes     Comment: occasionally       Objective: There were no vitals taken for this visit  Physical Exam    General: NAD, alert    Breasts: Incisions of bilateral breasts are clean, dry, and intact  Flaps are soft, warm, with capillary refill, however, around perimeter of right breast flap, there is epidermolysis/eschar present  Right breast flap with mildly dusky appearance, stable  No significant wounds, will continue to monitor   No evidence of hematoma formation  No signs of infection  Silvadene applied to right breast       Abdomen: Incision of abdomen is clean, dry, and intact  Umbilical epidermolysis resolved  There is no evidence of surrounding erythema, signs of infection, or wound breakdown  Abdomen is protuberant at baseline  There is no evidence of hematoma or seroma formation  Abdominal incision healing well  Right drain intact and patent with SSOP  Assessment/Plan: 81year old female s/p s/p second stage bilateral breast reconstruction with bilateral LIVE free tissue transfer, use of spy laser angiography for assessment of intraoperative flow, placement of kellie None DME negative pressure wound therapy dressing less than 50 cm²      Patient has 1 drain remaining in right abdomen  Discussed the continuation of abdominal binder, surgical bra, and following activity restrictions  Continue application of ointment to all incisions 2x daily, silvadene to right breast twice daily  We will see patient back in 1 week  Patient also seen by Dr Scottie Alvarado, he is aware of eschar around periphery of right breast flaps        Carmen Graf PA-C  Plastic & Reconstructive Surgery

## 2022-12-28 ENCOUNTER — OFFICE VISIT (OUTPATIENT)
Dept: PLASTIC SURGERY | Facility: CLINIC | Age: 72
End: 2022-12-28

## 2022-12-28 VITALS
HEIGHT: 63 IN | TEMPERATURE: 98.2 F | WEIGHT: 190 LBS | DIASTOLIC BLOOD PRESSURE: 78 MMHG | SYSTOLIC BLOOD PRESSURE: 132 MMHG | RESPIRATION RATE: 20 BRPM | OXYGEN SATURATION: 98 % | HEART RATE: 76 BPM | BODY MASS INDEX: 33.66 KG/M2

## 2022-12-28 DIAGNOSIS — Z98.890 S/P BREAST RECONSTRUCTION, BILATERAL: Primary | ICD-10-CM

## 2022-12-28 NOTE — PROGRESS NOTES
Subjective:    Patient ID: Ramon Bowman is a 81 y o  female  HPI Barby Orantes presents today for post-op follow up s/p second stage bilateral breast reconstruction with bilateral LIVE free tissue transfer, use of spy laser angiography for assessment of intraoperative flow, placement of kellie None DME negative pressure wound therapy dressing less than 50 cm² on 11/30/22  The patient reports that she is doing well this week and believes the appearance of her right breast has improved  She has been applying Silvadene to right breast twice daily  She denies any fevers, chills, swelling, bleeding, or drainage  She reports her right lower abdominal drain is putting out about 25 cc daily  She has been wearing compression on her abdomen and breasts and following restrictions  She has been applying Vaseline to all incisions           Past Medical History:   Diagnosis Date   • Cancer ([place] [address] )    • CPAP (continuous positive airway pressure) dependence    • Depression    • GERD (gastroesophageal reflux disease)    • Restless leg syndrome    • Sleep apnea        Patient Active Problem List   Diagnosis   • Chronic kidney disease, stage 3a (HCC)   • Prediabetes   • Obesity   • GERD (gastroesophageal reflux disease)   • CPAP (continuous positive airway pressure) dependence   • Admission for breast reconstruction following mastectomy   • Breast cancer ([place] [address] )   • S/P breast reconstruction, bilateral         Current Outpatient Medications:   •  acetaminophen (TYLENOL) 325 mg tablet, Take 3 tablets (975 mg total) by mouth every 6 (six) hours, Disp: 30 tablet, Rfl: 1  •  aspirin 325 mg tablet, Take 1 tablet (325 mg total) by mouth daily Do not start before December 5, 2022 , Disp: 30 tablet, Rfl: 0  •  Biotin 5 MG TABS, Take 5,000 mcg by mouth every morning, Disp: , Rfl:   •  citalopram (CeleXA) 20 mg tablet, Take 20 mg by mouth every morning, Disp: , Rfl:   •  cyclobenzaprine (FLEXERIL) 10 mg tablet, Take 1 tablet (10 mg total) by mouth 3 (three) times a day, Disp: 30 tablet, Rfl: 0  •  Doxepin HCl 6 MG TABS, Take 6 mg by mouth daily at bedtime, Disp: , Rfl:   •  Elastic Bandages & Supports (B-3 Extra High Comp Hose Women) MISC, Use, Disp: , Rfl:   •  fluticasone (FLONASE) 50 mcg/act nasal spray, 1 spray into each nostril if needed, Disp: , Rfl:   •  gabapentin (NEURONTIN) 300 mg capsule, Take 1 capsule (300 mg total) by mouth 3 (three) times a day, Disp: 60 capsule, Rfl: 0  •  Multiple Vitamin (Multi Vitamin Daily) TABS, every morning, Disp: , Rfl:   •  omeprazole (PriLOSEC) 20 mg delayed release capsule, Take 20 mg by mouth every morning, Disp: , Rfl:   •  silver sulfadiazine (SILVADENE,SSD) 1 % cream, Apply topically 2 (two) times a day for 14 days, Disp: 50 g, Rfl: 1  •  Cholecalciferol 25 MCG (1000 UT) capsule, Take 1,000 Units by mouth every morning, Disp: , Rfl:   •  enoxaparin (LOVENOX) 40 mg/0 4 mL, Inject 0 4 mL (40 mg total) under the skin daily for 7 days Do not start before December 5, 2022 , Disp: 2 8 mL, Rfl: 0    Past Surgical History:   Procedure Laterality Date   • BILATERAL OOPHORECTOMY     • ELBOW FRACTURE SURGERY     • MASTECTOMY     • [place] BREAST RECONSTRUCTION W/FREE FLAP Bilateral 11/30/2022    Procedure: RECONSTRUCTION BREAST W/FLAP FREE DEEP INFERIOR EPIGASTRIC  (LIVE); Surgeon: Christine Mariee MD;  Location: [facility];  Service: Plastics   • RHINOPLASTY     • SINUS SURGERY         Social History     Tobacco Use   • Smoking status: Never   • Smokeless tobacco: Never   Substance Use Topics   • Alcohol use: Yes     Comment: occasionally       Objective:  /78   Pulse 76   Temp 98 2 °F (36 8 °C)   Resp 20   Ht 5' 3" (1 6 m)   Wt 86 2 kg (190 lb)   SpO2 98%   BMI 33 66 kg/m²      Physical Exam    General: NAD, alert    Breasts: Incisions of bilateral breasts are clean, dry, and intact   Flaps are soft, warm, with capillary refill, however, around medial and inferior perimeter of right breast flap, there is eschar present  Right breast flap with mildly dusky appearance, stable  No significant wounds, will continue to monitor  Right breast appearance improved  No evidence of hematoma formation  No signs of infection  Silvadene applied to right breast       Abdomen: Incision of abdomen is clean, dry, and intact  Umbilical is pink and viable  There is no evidence of surrounding erythema, signs of infection, or wound breakdown  Abdomen is protuberant at baseline  There is no evidence of hematoma or seroma formation  Abdominal incision healing well  Right drain removed without difficulty, bacitracin and gauze applied to drain site  Assessment/Plan: 81year old female s/p s/p second stage bilateral breast reconstruction with bilateral LIVE free tissue transfer, use of spy laser angiography for assessment of intraoperative flow, placement of kellie None DME negative pressure wound therapy dressing less than 50 cm²      Discussed the continuation of surgical bra and abdominal binder during the day  May ease back into regular activities  Continue application of ointment to all incisions 2x daily, silvadene to right breast twice daily  We will see patient back in 2 weeks          Rubi Arzola PA-C  Plastic & Reconstructive Surgery

## 2023-01-11 ENCOUNTER — OFFICE VISIT (OUTPATIENT)
Dept: PLASTIC SURGERY | Facility: CLINIC | Age: 73
End: 2023-01-11

## 2023-01-11 ENCOUNTER — APPOINTMENT (OUTPATIENT)
Dept: LAB | Facility: CLINIC | Age: 73
End: 2023-01-11

## 2023-01-11 VITALS
BODY MASS INDEX: 34.12 KG/M2 | WEIGHT: 192.6 LBS | HEIGHT: 63 IN | OXYGEN SATURATION: 96 % | DIASTOLIC BLOOD PRESSURE: 72 MMHG | TEMPERATURE: 98.1 F | SYSTOLIC BLOOD PRESSURE: 138 MMHG | RESPIRATION RATE: 20 BRPM | HEART RATE: 97 BPM

## 2023-01-11 DIAGNOSIS — Z98.890 S/P BREAST RECONSTRUCTION, BILATERAL: ICD-10-CM

## 2023-01-11 DIAGNOSIS — Z98.890 S/P BREAST RECONSTRUCTION, BILATERAL: Primary | ICD-10-CM

## 2023-01-11 LAB
ALBUMIN SERPL BCP-MCNC: 3.3 G/DL (ref 3.5–5)
ALP SERPL-CCNC: 79 U/L (ref 46–116)
ALT SERPL W P-5'-P-CCNC: 19 U/L (ref 12–78)
ANION GAP SERPL CALCULATED.3IONS-SCNC: 4 MMOL/L (ref 4–13)
AST SERPL W P-5'-P-CCNC: 12 U/L (ref 5–45)
BASOPHILS # BLD AUTO: 0.02 THOUSANDS/ÂΜL (ref 0–0.1)
BASOPHILS NFR BLD AUTO: 0 % (ref 0–1)
BILIRUB SERPL-MCNC: 0.23 MG/DL (ref 0.2–1)
BUN SERPL-MCNC: 13 MG/DL (ref 5–25)
CALCIUM ALBUM COR SERPL-MCNC: 9.5 MG/DL (ref 8.3–10.1)
CALCIUM SERPL-MCNC: 8.9 MG/DL (ref 8.3–10.1)
CHLORIDE SERPL-SCNC: 106 MMOL/L (ref 96–108)
CO2 SERPL-SCNC: 29 MMOL/L (ref 21–32)
CREAT SERPL-MCNC: 1.09 MG/DL (ref 0.6–1.3)
EOSINOPHIL # BLD AUTO: 0.26 THOUSAND/ÂΜL (ref 0–0.61)
EOSINOPHIL NFR BLD AUTO: 4 % (ref 0–6)
ERYTHROCYTE [DISTWIDTH] IN BLOOD BY AUTOMATED COUNT: 14 % (ref 11.6–15.1)
GFR SERPL CREATININE-BSD FRML MDRD: 51 ML/MIN/1.73SQ M
GLUCOSE P FAST SERPL-MCNC: 121 MG/DL (ref 65–99)
HCT VFR BLD AUTO: 35.4 % (ref 34.8–46.1)
HGB BLD-MCNC: 10.6 G/DL (ref 11.5–15.4)
IMM GRANULOCYTES # BLD AUTO: 0.01 THOUSAND/UL (ref 0–0.2)
IMM GRANULOCYTES NFR BLD AUTO: 0 % (ref 0–2)
LYMPHOCYTES # BLD AUTO: 2.1 THOUSANDS/ÂΜL (ref 0.6–4.47)
LYMPHOCYTES NFR BLD AUTO: 30 % (ref 14–44)
MCH RBC QN AUTO: 26.6 PG (ref 26.8–34.3)
MCHC RBC AUTO-ENTMCNC: 29.9 G/DL (ref 31.4–37.4)
MCV RBC AUTO: 89 FL (ref 82–98)
MONOCYTES # BLD AUTO: 0.76 THOUSAND/ÂΜL (ref 0.17–1.22)
MONOCYTES NFR BLD AUTO: 11 % (ref 4–12)
NEUTROPHILS # BLD AUTO: 3.78 THOUSANDS/ÂΜL (ref 1.85–7.62)
NEUTS SEG NFR BLD AUTO: 55 % (ref 43–75)
NRBC BLD AUTO-RTO: 0 /100 WBCS
PLATELET # BLD AUTO: 192 THOUSANDS/UL (ref 149–390)
PMV BLD AUTO: 11.6 FL (ref 8.9–12.7)
POTASSIUM SERPL-SCNC: 3.8 MMOL/L (ref 3.5–5.3)
PROT SERPL-MCNC: 6.9 G/DL (ref 6.4–8.4)
RBC # BLD AUTO: 3.99 MILLION/UL (ref 3.81–5.12)
SODIUM SERPL-SCNC: 139 MMOL/L (ref 135–147)
WBC # BLD AUTO: 6.93 THOUSAND/UL (ref 4.31–10.16)

## 2023-01-11 RX ORDER — IBUPROFEN 200 MG
CAPSULE ORAL DAILY
Qty: 25 EACH | Refills: 0 | Status: SHIPPED | OUTPATIENT
Start: 2023-01-11

## 2023-01-11 NOTE — PROGRESS NOTES
Subjective:    Patient ID: Jessica Jacobson is a 81 y o  female  HPI Mikey Oliveira presents today for post-op follow up s/p second stage bilateral breast reconstruction with bilateral LIVE free tissue transfer, use of spy laser angiography for assessment of intraoperative flow, placement of kellie None DME negative pressure wound therapy dressing less than 50 cm² on 11/30/22  The patient reports that she is doing well this week and believes the appearance of her right breast has improved  She has been applying Silvadene to right breast twice daily  She denies any fevers, chills, swelling, bleeding, or drainage  She has been wearing compression on her breasts and applying Vaseline to all incisions  She does report fatigue since surgery         Past Medical History:   Diagnosis Date   • Cancer ([place] [address] )    • CPAP (continuous positive airway pressure) dependence    • Depression    • GERD (gastroesophageal reflux disease)    • Restless leg syndrome    • Sleep apnea        Patient Active Problem List   Diagnosis   • Chronic kidney disease, stage 3a (HCC)   • Prediabetes   • Obesity   • GERD (gastroesophageal reflux disease)   • CPAP (continuous positive airway pressure) dependence   • Admission for breast reconstruction following mastectomy   • Breast cancer ([place] [address] )   • S/P breast reconstruction, bilateral         Current Outpatient Medications:   •  acetaminophen (TYLENOL) 325 mg tablet, Take 3 tablets (975 mg total) by mouth every 6 (six) hours, Disp: 30 tablet, Rfl: 1  •  aspirin 325 mg tablet, Take 1 tablet (325 mg total) by mouth daily Do not start before December 5, 2022 , Disp: 30 tablet, Rfl: 0  •  Biotin 5 MG TABS, Take 5,000 mcg by mouth every morning, Disp: , Rfl:   •  citalopram (CeleXA) 20 mg tablet, Take 20 mg by mouth every morning, Disp: , Rfl:   •  collagenase (SANTYL) ointment, Apply topically daily, Disp: 30 g, Rfl: 0  •  cyclobenzaprine (FLEXERIL) 10 mg tablet, Take 1 tablet (10 mg total) by mouth 3 (three) times a day, Disp: 30 tablet, Rfl: 0  •  Doxepin HCl 6 MG TABS, Take 6 mg by mouth daily at bedtime TAKING 12mg, Disp: , Rfl:   •  Elastic Bandages & Supports (B-3 Extra High Comp Hose Women) MISC, Use, Disp: , Rfl:   •  fluticasone (FLONASE) 50 mcg/act nasal spray, 1 spray into each nostril if needed, Disp: , Rfl:   •  gabapentin (NEURONTIN) 300 mg capsule, Take 1 capsule (300 mg total) by mouth 3 (three) times a day, Disp: 60 capsule, Rfl: 0  •  Gauze Pads & Dressings (GAUZE PADS 4"X4") 4"X4" PADS, Use in the morning, Disp: 25 each, Rfl: 0  •  Multiple Vitamin (Multi Vitamin Daily) TABS, every morning, Disp: , Rfl:   •  omeprazole (PriLOSEC) 20 mg delayed release capsule, Take 20 mg by mouth every morning, Disp: , Rfl:   •  Cholecalciferol 25 MCG (1000 UT) capsule, Take 1,000 Units by mouth every morning, Disp: , Rfl:   •  enoxaparin (LOVENOX) 40 mg/0 4 mL, Inject 0 4 mL (40 mg total) under the skin daily for 7 days Do not start before December 5, 2022 , Disp: 2 8 mL, Rfl: 0    Past Surgical History:   Procedure Laterality Date   • BILATERAL OOPHORECTOMY     • ELBOW FRACTURE SURGERY     • MASTECTOMY     • [place] BREAST RECONSTRUCTION W/FREE FLAP Bilateral 11/30/2022    Procedure: RECONSTRUCTION BREAST W/FLAP FREE DEEP INFERIOR EPIGASTRIC  (LIVE); Surgeon: Brock Rivera MD;  Location: [facility];  Service: Plastics   • RHINOPLASTY     • SINUS SURGERY         Social History     Tobacco Use   • Smoking status: Never   • Smokeless tobacco: Never   Substance Use Topics   • Alcohol use: Yes     Comment: occasionally       Objective:  /72 (BP Location: Right arm, Patient Position: Sitting, Cuff Size: Standard)   Pulse 97   Temp 98 1 °F (36 7 °C) (Temporal)   Resp 20   Ht 5' 3" (1 6 m)   Wt 87 4 kg (192 lb 9 6 oz)   SpO2 96%   BMI 34 12 kg/m²      Physical Exam    General: NAD, alert    Breasts: Incisions of bilateral breasts are clean, dry, and intact   Flaps are soft, warm, with capillary refill, however, around medial aspect of right breast flap, there is eschar present  Right breast flap with mildly dusky appearance, stable  No evidence of hematoma formation  No signs of infection  Right breast eschar sharply debrided, underlying necrotic tissue present  Xeroform applied       Abdomen: Incision of abdomen is clean, dry, and intact  Umbilicus is pink and viable  There is no evidence of surrounding erythema, signs of infection, or wound breakdown  Abdomen is protuberant at baseline  There is no evidence of hematoma or seroma formation  Abdominal incision healing well  Assessment/Plan: 81year old female s/p second stage bilateral breast reconstruction with bilateral LIVE free tissue transfer, use of spy laser angiography for assessment of intraoperative flow, placement of kellie None DME negative pressure wound therapy dressing less than 50 cm²        Will check labs due to fatigue  Santyl sent to pharmacy - pt advised to apply santyl and gauze to right breast daily  Will recheck in 2 weeks  Discussed the continuation of surgical bra and abdominal binder during the day  May ease back into regular activities  Continue application of ointment to all incisions 2x daily          Kymberly Tse PA-C  Plastic & Reconstructive Surgery

## 2023-01-16 RX ORDER — GABAPENTIN 300 MG/1
CAPSULE ORAL
Qty: 60 CAPSULE | Refills: 0 | Status: SHIPPED | OUTPATIENT
Start: 2023-01-16

## 2023-01-27 ENCOUNTER — OFFICE VISIT (OUTPATIENT)
Dept: PLASTIC SURGERY | Facility: CLINIC | Age: 73
End: 2023-01-27

## 2023-01-27 DIAGNOSIS — Z98.890 S/P BREAST RECONSTRUCTION, BILATERAL: Primary | ICD-10-CM

## 2023-01-27 NOTE — PROGRESS NOTES
Subjective:    Patient ID: Sage Mojica is a 81 y o  female  HPI Paulaia Sera presents today for post-op follow up s/p second stage bilateral breast reconstruction with bilateral LIVE free tissue transfer, use of spy laser angiography for assessment of intraoperative flow, placement of kellie None DME negative pressure wound therapy dressing less than 50 cm² on 11/30/22  Pt presents for right breast wound check  She has been applying Santyl to right breast twice daily  She denies any fevers, chills, swelling, bleeding, or drainage  She reports wound got "deeper " She has been wearing compression on her breasts and applying Vaseline to all incisions  Pt denies smoking         Past Medical History:   Diagnosis Date   • Cancer ([place] [address] )    • CPAP (continuous positive airway pressure) dependence    • Depression    • GERD (gastroesophageal reflux disease)    • Restless leg syndrome    • Sleep apnea        Patient Active Problem List   Diagnosis   • Chronic kidney disease, stage 3a (HCC)   • Prediabetes   • Obesity   • GERD (gastroesophageal reflux disease)   • CPAP (continuous positive airway pressure) dependence   • Admission for breast reconstruction following mastectomy   • Breast cancer ([place] [address] )   • S/P breast reconstruction, bilateral         Current Outpatient Medications:   •  acetaminophen (TYLENOL) 325 mg tablet, Take 3 tablets (975 mg total) by mouth every 6 (six) hours, Disp: 30 tablet, Rfl: 1  •  aspirin 325 mg tablet, Take 1 tablet (325 mg total) by mouth daily Do not start before December 5, 2022 , Disp: 30 tablet, Rfl: 0  •  Biotin 5 MG TABS, Take 5,000 mcg by mouth every morning, Disp: , Rfl:   •  Cholecalciferol 25 MCG (1000 UT) capsule, Take 1,000 Units by mouth every morning, Disp: , Rfl:   •  citalopram (CeleXA) 20 mg tablet, Take 20 mg by mouth every morning, Disp: , Rfl:   •  collagenase (SANTYL) ointment, Apply topically daily, Disp: 30 g, Rfl: 0  •  cyclobenzaprine (FLEXERIL) 10 mg tablet, Take 1 tablet (10 mg total) by mouth 3 (three) times a day, Disp: 30 tablet, Rfl: 0  •  Doxepin HCl 6 MG TABS, Take 6 mg by mouth daily at bedtime TAKING 12mg, Disp: , Rfl:   •  Elastic Bandages & Supports (B-3 Extra High Comp Hose Women) MISC, Use, Disp: , Rfl:   •  enoxaparin (LOVENOX) 40 mg/0 4 mL, Inject 0 4 mL (40 mg total) under the skin daily for 7 days Do not start before December 5, 2022 , Disp: 2 8 mL, Rfl: 0  •  fluticasone (FLONASE) 50 mcg/act nasal spray, 1 spray into each nostril if needed, Disp: , Rfl:   •  gabapentin (NEURONTIN) 300 mg capsule, take 1 capsule by mouth three times a day, Disp: 60 capsule, Rfl: 0  •  Gauze Pads & Dressings (GAUZE PADS 4"X4") 4"X4" PADS, Use in the morning, Disp: 25 each, Rfl: 0  •  Multiple Vitamin (Multi Vitamin Daily) TABS, every morning, Disp: , Rfl:   •  omeprazole (PriLOSEC) 20 mg delayed release capsule, Take 20 mg by mouth every morning, Disp: , Rfl:     Past Surgical History:   Procedure Laterality Date   • BILATERAL OOPHORECTOMY     • ELBOW FRACTURE SURGERY     • MASTECTOMY     • [place] BREAST RECONSTRUCTION W/FREE FLAP Bilateral 11/30/2022    Procedure: RECONSTRUCTION BREAST W/FLAP FREE DEEP INFERIOR EPIGASTRIC  (LIVE); Surgeon: Aashish Helton MD;  Location: [facility];  Service: Plastics   • RHINOPLASTY     • SINUS SURGERY         Social History     Tobacco Use   • Smoking status: Never   • Smokeless tobacco: Never   Substance Use Topics   • Alcohol use: Yes     Comment: occasionally       Objective: There were no vitals taken for this visit  Physical Exam    General: NAD, alert    Breasts: Right medial breast flap wound 7cm x 2cm x 1cm, necrotic debris still present but improved  No eschar  Remaining incisions of bilateral breasts are clean, dry, and intact  Flaps are soft, warm, with capillary refill  Right breast flap with mildly dusky appearance, stable  No evidence of hematoma or seroma formation  No signs of infection   Right breast wound sharply debrided      Abdomen: Small wound noted along left lower abdominal incision, about 1x 5x 5cm  Remaining incision of abdomen is clean, dry, and intact  Umbilicus is pink and viable  There is no evidence of surrounding erythema, signs of infection, or other wound breakdown  Abdomen is protuberant at baseline  There is no evidence of hematoma or seroma formation  Abdominal incision healing well  Assessment/Plan: 81year old female s/p second stage bilateral breast reconstruction with bilateral LIVE free tissue transfer, use of spy laser angiography for assessment of intraoperative flow, placement of kellie None DME negative pressure wound therapy dressing less than 50 cm²        Continue 2x daily wound care with Santyl to right breat wound  Antibiotic ointment to abdominal wound daily  Will recheck in 2-3 weeks  Discussed the continuation of surgical bra and abdominal binder during the day  May ease back into regular activities  Advised to return sooner with any questions           Becca Wilder PA-C  Plastic & Reconstructive Surgery

## 2023-01-31 DIAGNOSIS — Z98.890 S/P BREAST RECONSTRUCTION, BILATERAL: ICD-10-CM

## 2023-02-20 ENCOUNTER — OFFICE VISIT (OUTPATIENT)
Dept: PLASTIC SURGERY | Facility: CLINIC | Age: 73
End: 2023-02-20

## 2023-02-20 DIAGNOSIS — Z98.890 S/P BREAST RECONSTRUCTION, BILATERAL: Primary | ICD-10-CM

## 2023-02-20 NOTE — PROGRESS NOTES
Subjective:    Patient ID: Christiano Rizzoite is a 81 y o  female  HPI [place] presents today for post-op follow up s/p second stage bilateral breast reconstruction with bilateral LIVE free tissue transfer, use of spy laser angiography for assessment of intraoperative flow, placement of kellie None DME negative pressure wound therapy dressing less than 50 cm² on 11/30/22  Pt presents for right breast wound check  She has been applying Santyl to right breast twice daily  She denies any fevers, chills, swelling, bleeding, or drainage  She states she has not noticed a significant change in the wounds appearance  She has been wearing compression on her breasts  She also reports persistent small abdominal wound       Past Medical History:   Diagnosis Date   • [facility]    • CPAP (continuous positive airway pressure) dependence    • Depression    • GERD (gastroesophageal reflux disease)    • Restless leg syndrome    • Sleep apnea        Patient Active Problem List   Diagnosis   • Chronic kidney disease, stage 3a (HCC)   • Prediabetes   • Obesity   • GERD (gastroesophageal reflux disease)   • CPAP (continuous positive airway pressure) dependence   • Admission for breast reconstruction following mastectomy   • Breast cancer (HCC)   • S/P breast reconstruction, bilateral         Current Outpatient Medications:   •  acetaminophen (TYLENOL) 325 mg tablet, Take 3 tablets (975 mg total) by mouth every 6 (six) hours, Disp: 30 tablet, Rfl: 1  •  aspirin 325 mg tablet, Take 1 tablet (325 mg total) by mouth daily Do not start before December 5, 2022 , Disp: 30 tablet, Rfl: 0  •  Biotin 5 MG TABS, Take 5,000 mcg by mouth every morning, Disp: , Rfl:   •  citalopram (CeleXA) 20 mg tablet, Take 20 mg by mouth every morning, Disp: , Rfl:   •  Doxepin HCl 6 MG TABS, Take 6 mg by mouth daily at bedtime TAKING 12mg, Disp: , Rfl:   •  Elastic Bandages & Supports (B-3 Extra High Comp Hose Women) MISC, Use, Disp: , Rfl:   •  fluticasone (FLONASE) 50 mcg/act nasal spray, 1 spray into each nostril if needed, Disp: , Rfl:   •  gabapentin (NEURONTIN) 300 mg capsule, take 1 capsule by mouth three times a day, Disp: 60 capsule, Rfl: 0  •  Gauze Pads & Dressings (GAUZE PADS 4"X4") 4"X4" PADS, Use in the morning, Disp: 25 each, Rfl: 0  •  Multiple Vitamin (Multi Vitamin Daily) TABS, every morning, Disp: , Rfl:   •  omeprazole (PriLOSEC) 20 mg delayed release capsule, Take 20 mg by mouth every morning, Disp: , Rfl:     Past Surgical History:   Procedure Laterality Date   • BILATERAL OOPHORECTOMY     • ELBOW FRACTURE SURGERY     • MASTECTOMY     • [place] BREAST RECONSTRUCTION W/FREE FLAP Bilateral 11/30/2022    Procedure: RECONSTRUCTION BREAST W/FLAP FREE DEEP INFERIOR EPIGASTRIC  (LIVE); Surgeon: Debra Marie MD;  Location[facility];  Service: Plastics   • RHINOPLASTY     • SINUS SURGERY         Social History     Tobacco Use   • Smoking status: Never   • Smokeless tobacco: Never   Substance Use Topics   • Alcohol use: Yes     Comment: occasionally       Objective: There were no vitals taken for this visit  Physical Exam    General: NAD, alert    Breasts: Right medial breast flap wound improved, with new bridges of skin formation  Wound is now 3 small wounds - superior 1x 5cm, medial 3x1cm, and inferior 1 [id number]  Necrotic tissue and eschar resolved  Remaining incisions of bilateral breasts are clean, dry, and intact  Flaps are soft, warm, with capillary refill  Right breast flap with mildly dusky appearance, stable  No evidence of hematoma or seroma formation  No signs of infection  Right breast wound sharply debrided  Wound dressed with saline wet to dry dressing       Abdomen: Small wound noted along left lower abdominal incision, about 1x 5x 5cm  Remaining incision of abdomen is clean, dry, and intact  Umbilicus is pink and viable  There is no evidence of surrounding erythema, signs of infection, or other wound breakdown   Abdomen is protuberant at baseline  There is no evidence of hematoma or seroma formation  Abdominal incision otherwise healing well  Assessment/Plan: 81year old female s/p second stage bilateral breast reconstruction with bilateral LIVE free tissue transfer, use of spy laser angiography for assessment of intraoperative flow, placement of kellie None DME negative pressure wound therapy dressing less than 50 cm²      Advised patient that the appearance of the right breast wound has definitely improved significantly  D/C Arash  Educated pt on how to apply daily wet to dry dressings to abdominal and breasts wounds  Will recheck in 2-3 weeks  Discussed the continuation of surgical bra and abdominal binder during the day  Advised to return sooner with any questions           Miguelangel Shore PA-C  Plastic & Reconstructive Surgery

## 2023-03-31 ENCOUNTER — OFFICE VISIT (OUTPATIENT)
Dept: PLASTIC SURGERY | Facility: CLINIC | Age: 73
End: 2023-03-31

## 2023-03-31 DIAGNOSIS — Z98.890 S/P BREAST RECONSTRUCTION, BILATERAL: Primary | ICD-10-CM

## 2023-03-31 NOTE — PROGRESS NOTES
Subjective:    Patient ID: Barbi Moreira is a 81 y o  female  HPI Gilda Gregory presents today for post-op follow up s/p second stage bilateral breast reconstruction with bilateral LIVE free tissue transfer, use of spy laser angiography for assessment of intraoperative flow, placement of kellie None DME negative pressure wound therapy dressing less than 50 cm² on 11/30/22  Pt presents for right breast wound check  She reports significant improvement in wound  She has been applying WTD dressings to right breast daily  She denies any worsening of wound, fevers, chills, swelling, or bleeding  She does report moderate serous drainage from wound         Past Medical History:   Diagnosis Date   • Cancer ([place] [address] )    • CPAP (continuous positive airway pressure) dependence    • Depression    • GERD (gastroesophageal reflux disease)    • Restless leg syndrome    • Sleep apnea        Patient Active Problem List   Diagnosis   • Chronic kidney disease, stage 3a (HCC)   • Prediabetes   • Obesity   • GERD (gastroesophageal reflux disease)   • CPAP (continuous positive airway pressure) dependence   • Admission for breast reconstruction following mastectomy   • Breast cancer ([place] [address] )   • S/P breast reconstruction, bilateral         Current Outpatient Medications:   •  acetaminophen (TYLENOL) 325 mg tablet, Take 3 tablets (975 mg total) by mouth every 6 (six) hours, Disp: 30 tablet, Rfl: 1  •  aspirin 325 mg tablet, Take 1 tablet (325 mg total) by mouth daily Do not start before December 5, 2022 , Disp: 30 tablet, Rfl: 0  •  Biotin 5 MG TABS, Take 5,000 mcg by mouth every morning, Disp: , Rfl:   •  citalopram (CeleXA) 20 mg tablet, Take 20 mg by mouth every morning, Disp: , Rfl:   •  Doxepin HCl 6 MG TABS, Take 6 mg by mouth daily at bedtime TAKING 12mg, Disp: , Rfl:   •  Elastic Bandages & Supports (B-3 Extra High Comp Hose Women) MISC, Use, Disp: , Rfl:   •  fluticasone (FLONASE) 50 mcg/act nasal spray, 1 spray into each nostril if needed, "Disp: , Rfl:   •  gabapentin (NEURONTIN) 300 mg capsule, take 1 capsule by mouth three times a day, Disp: 60 capsule, Rfl: 0  •  Gauze Pads & Dressings (GAUZE PADS 4\"X4\") 4\"X4\" PADS, Use in the morning, Disp: 25 each, Rfl: 0  •  Multiple Vitamin (Multi Vitamin Daily) TABS, every morning, Disp: , Rfl:   •  omeprazole (PriLOSEC) 20 mg delayed release capsule, Take 20 mg by mouth every morning, Disp: , Rfl:     Past Surgical History:   Procedure Laterality Date   • BILATERAL OOPHORECTOMY     • ELBOW FRACTURE SURGERY     • MASTECTOMY     • [place] BREAST RECONSTRUCTION W/FREE FLAP Bilateral 11/30/2022    Procedure: RECONSTRUCTION BREAST W/FLAP FREE DEEP INFERIOR EPIGASTRIC  (LIVE); Surgeon: Lakisha Vega MD;  [facility];  Service: Plastics   • RHINOPLASTY     • SINUS SURGERY         Social History     Tobacco Use   • Smoking status: Never   • Smokeless tobacco: Never   Substance Use Topics   • Alcohol use: Yes     Comment: occasionally       Objective: There were no vitals taken for this visit  Physical Exam    General: NAD, alert    Breasts: Right medial breast flap wound significantly improved  Wound is 5cm x 2mm x 0 5cm  Remaining incisions of bilateral breasts are clean, dry, and intact  Flaps are soft, warm, with capillary refill  No evidence of hematoma or seroma formation  No signs of infection       Abdomen: incision of abdomen is clean, dry, and intact  Umbilicus is pink and viable  There is no evidence of surrounding erythema, signs of infection  Abdomen is protuberant at baseline  There is no evidence of hematoma or seroma formation  Abdominal incision healing well         Assessment/Plan: 81year old female s/p second stage bilateral breast reconstruction with bilateral LIVE free tissue transfer, use of spy laser angiography for assessment of intraoperative flow, placement of kellie None DME negative pressure wound therapy dressing less than 50 cm²      Advised patient that the " appearance of the right breast wound has definitely improved significantly  D/C WTD  Recommend daily application of calcium alginate over wound to wick away moisture  Wound healing very well  Will recheck in 4-6 weeks  Discussed the continuation of surgical bra and abdominal binder during the day  Recommend Vaseline/aquaphor to all other incisions  Advised to return sooner with any questions           Penny Rene PA-C  Plastic & Reconstructive Surgery

## 2023-05-16 ENCOUNTER — OFFICE VISIT (OUTPATIENT)
Dept: PLASTIC SURGERY | Facility: CLINIC | Age: 73
End: 2023-05-16

## 2023-05-16 DIAGNOSIS — Z98.890 S/P BREAST RECONSTRUCTION, BILATERAL: Primary | ICD-10-CM

## 2023-05-16 NOTE — PROGRESS NOTES
"Subjective:    Patient ID: Moris Poe is a 81 y o  female  HPI Flaca Lincoln presents today for post-op follow up s/p second stage bilateral breast reconstruction with bilateral LIVE free tissue transfer, use of spy laser angiography for assessment of intraoperative flow, placement of kellie None DME negative pressure wound therapy dressing less than 50 cm² on 11/30/22  Pt presents for right breast wound check  She reports significant improvement in wound, although still presents  She reports associated drainage  She denies any fevers, chills, swelling, or bleeding  The patient is unhappy with the \"caved in\" appearance of her right breast, and states her left breast is much larger         Past Medical History:   Diagnosis Date   • Cancer ([facility] [address] )    • CPAP (continuous positive airway pressure) dependence    • Depression    • GERD (gastroesophageal reflux disease)    • Restless leg syndrome    • Sleep apnea        Patient Active Problem List   Diagnosis   • Chronic kidney disease, stage 3a (HCC)   • Prediabetes   • Obesity   • GERD (gastroesophageal reflux disease)   • CPAP (continuous positive airway pressure) dependence   • Admission for breast reconstruction following mastectomy   • Breast cancer (HCC)   • S/P breast reconstruction, bilateral         Current Outpatient Medications:   •  acetaminophen (TYLENOL) 325 mg tablet, Take 3 tablets (975 mg total) by mouth every 6 (six) hours, Disp: 30 tablet, Rfl: 1  •  aspirin 325 mg tablet, Take 1 tablet (325 mg total) by mouth daily Do not start before December 5, 2022 , Disp: 30 tablet, Rfl: 0  •  Biotin 5 MG TABS, Take 5,000 mcg by mouth every morning, Disp: , Rfl:   •  citalopram (CeleXA) 20 mg tablet, Take 20 mg by mouth every morning, Disp: , Rfl:   •  Doxepin HCl 6 MG TABS, Take 6 mg by mouth daily at bedtime TAKING 12mg, Disp: , Rfl:   •  Elastic Bandages & Supports (B-3 Extra High Comp Hose Women) MISC, Use, Disp: , Rfl:   •  fluticasone (FLONASE) 50 mcg/act nasal " "spray, 1 spray into each nostril if needed, Disp: , Rfl:   •  gabapentin (NEURONTIN) 300 mg capsule, take 1 capsule by mouth three times a day, Disp: 60 capsule, Rfl: 0  •  Gauze Pads & Dressings (GAUZE PADS 4\"X4\") 4\"X4\" PADS, Use in the morning, Disp: 25 each, Rfl: 0  •  Multiple Vitamin (Multi Vitamin Daily) TABS, every morning, Disp: , Rfl:   •  omeprazole (PriLOSEC) 20 mg delayed release capsule, Take 20 mg by mouth every morning, Disp: , Rfl:     Past Surgical History:   Procedure Laterality Date   • BILATERAL OOPHORECTOMY     • ELBOW FRACTURE SURGERY     • MASTECTOMY     • CO BREAST RECONSTRUCTION W/FREE FLAP Bilateral 11/30/2022    Procedure: RECONSTRUCTION BREAST W/FLAP FREE DEEP INFERIOR EPIGASTRIC  (LIVE); Surgeon: Claudia Paniagua MD;  [facility];  Service: Plastics   • RHINOPLASTY     • SINUS SURGERY         Social History     Tobacco Use   • Smoking status: Never   • Smokeless tobacco: Never   Substance Use Topics   • Alcohol use: Yes     Comment: occasionally       Objective: There were no vitals taken for this visit  Physical Exam    General: NAD, alert    Breasts: Right breast wound is 1cm x 1cm with serosang drainage  There is a defect/contour deformity in the right breast due to previous area of fat necrosis  Remaining incisions of bilateral breasts are clean, dry, and intact  Flaps are soft, warm, with capillary refill  No evidence of hematoma or seroma formation  No signs of infection       Abdomen: incision of abdomen is clean, dry, and intact  Umbilicus is pink and viable  There is no evidence of surrounding erythema, signs of infection  Abdomen is protuberant at baseline  There is no evidence of hematoma or seroma formation  Abdominal incision healing well  There is multiple areas of scar tissue palpable beneath incision         Assessment/Plan: 81year old female s/p second stage bilateral breast reconstruction with bilateral LIVE free tissue transfer, use of " spy laser angiography for assessment of intraoperative flow, placement of kellie None DME negative pressure wound therapy dressing less than 50 cm²        Pictures taken  Patient discussed with Dr Leo Mcintosh  Will plan for revision to bilateral breasts for symmetry  Advised patient that the appearance of the right breast wound has definitely improved significantly  Recommend daily application of calcium alginate over wound to wick away moisture  Will see patient back for pre-op when surgery scheduled  Advised to return sooner with any questions           Renzo Amador PA-C  Plastic & Reconstructive Surgery

## 2023-06-02 ENCOUNTER — TELEPHONE (OUTPATIENT)
Dept: PLASTIC SURGERY | Facility: CLINIC | Age: 73
End: 2023-06-02

## 2023-06-08 ENCOUNTER — PREP FOR PROCEDURE (OUTPATIENT)
Dept: PLASTIC SURGERY | Facility: CLINIC | Age: 73
End: 2023-06-08

## 2023-06-08 DIAGNOSIS — Z98.890 S/P BREAST RECONSTRUCTION: ICD-10-CM

## 2023-06-08 DIAGNOSIS — C50.919 MALIGNANT NEOPLASM OF FEMALE BREAST, UNSPECIFIED ESTROGEN RECEPTOR STATUS, UNSPECIFIED LATERALITY, UNSPECIFIED SITE OF BREAST (HCC): Primary | ICD-10-CM

## 2023-07-18 ENCOUNTER — OFFICE VISIT (OUTPATIENT)
Dept: PLASTIC SURGERY | Facility: CLINIC | Age: 73
End: 2023-07-18
Payer: COMMERCIAL

## 2023-07-18 VITALS
TEMPERATURE: 96 F | WEIGHT: 190 LBS | SYSTOLIC BLOOD PRESSURE: 170 MMHG | HEART RATE: 98 BPM | BODY MASS INDEX: 33.66 KG/M2 | HEIGHT: 63 IN | DIASTOLIC BLOOD PRESSURE: 97 MMHG

## 2023-07-18 DIAGNOSIS — Z98.890 S/P BREAST RECONSTRUCTION, BILATERAL: Primary | ICD-10-CM

## 2023-07-18 PROCEDURE — 99215 OFFICE O/P EST HI 40 MIN: CPT | Performed by: PLASTIC SURGERY

## 2023-07-18 NOTE — PROGRESS NOTES
Assessment/Plan   74-year-old female status post bilateral breast reconstruction with D IEP flaps with fat necrosis of the right breast for bilateral revision with excision of right breast skin paddle, correction of contour deformity, repositioning of flap with fat grafting to right breast, reduction of left breast and revision of abdominal lateral excess  1.)  We discussed the risks, benefits, alternatives  2.)  All her questions were answered to her satisfaction  3.)  Informed consent obtained and signed, will proceed to the OR as scheduled. Discussion--I discussed with the patient the risks, benefits, turns for surgery as well as the surgical plan. We will proceed with excision of the skin paddle and fat necrosis on the right breast to improve her contour, fat grafting to the right breast for improvement of volume and reduction of the left breast for symmetry, I discussed with the patient the option of fat grafting and the use of her abdominal donor site, we discussed the risks, benefits, alternatives including risk of bleeding, infection, scarring, poor wound healing, damage surrounding underlying structures, need for further surgery, need for multiple procedures, seroma, DVT, under correction, overcorrection, fat necrosis, need for donor site, I did discuss with her that it is highly likely that she will need multiple staged procedures, we discussed the hospital stay, expected recovery time, all her questions were answered to her satisfaction, informed consent obtained and signed, will proceed to the OR as scheduled. Subjective   Patient ID: Renetta Stanley is a 81 y.o. female. Vitals:    07/18/23 1300   BP: 170/97   Pulse: 98   Temp: (!) 96 °F (35.6 °C)     HPI  Patient is a 74-year-old female known to me for bilateral breast reconstruction with fat necrosis of her right breast status post D IEP.   She is here today to discuss the upcoming surgical plan, she is unhappy with the size and appearance of her right breast that she feels is much smaller than her left, she is also interested in possible reduction of her left breast, she has no other complaints, she has no changes to her medical history. The following portions of the patient's history were reviewed and updated as appropriate: allergies, current medications, past family history, past medical history, past social history, past surgical history and problem list.    Review of Systems   All other systems reviewed and are negative. Objective   Physical Exam   Constitutional  She appears well-developed and well-nourished. Eyes  Pupils are equal, round, and reactive to light. System normal.     General -             Right: Right eye extraocular movements are normal.             Left: Left eye extraocular movements are normal.     Cardiovascular: Normal rate, regular rhythm and normal heart sounds. Pulmonary/Chest  Effort normal and breath sounds normal.     Psychiatric  She has a normal mood and affect. Her behavior is normal. Judgment and thought content normal.   Abdomen--soft, nontender, nondistended    Bilateral breast--patient with significant breast asymmetry, her right breast is much smaller than her left, she does have fat necrosis in the pocket and appearance of the skin paddle on the right side, left breast has good shape and contour however is much larger than the right, she has moderate skin excess and standing cutaneous deformity of the bilateral hips. Past Medical History:   Diagnosis Date   • Cancer ([address])    • CPAP (continuous positive airway pressure) dependence    • Depression    • Fracture of nasal bones 05/08/19   • GERD (gastroesophageal reflux disease)    • Restless leg syndrome    • Skin cancer 12/27/16    first time seen at [facility].  Had  issues for several years previous   • Sleep apnea      Past Surgical History:   Procedure Laterality Date   • BILATERAL OOPHORECTOMY     • BREAST RECONSTRUCTION  11/30/2022    keven • ELBOW FRACTURE SURGERY     • MASTECTOMY     • CO BREAST RECONSTRUCTION W/FREE FLAP Bilateral 2022    Procedure: RECONSTRUCTION BREAST W/FLAP FREE DEEP INFERIOR EPIGASTRIC  (LIVE);   Surgeon: Kacey Antunez MD;  [facility];  Service: Plastics   • RHINOPLASTY     • SINUS SURGERY     • SKIN CANCER EXCISION  2020    [facility]     Current Outpatient Medications   Medication Instructions   • acetaminophen (TYLENOL) 975 mg, Oral, Every 6 hours scheduled   • Biotin 5,000 mcg, Oral, Every morning   • citalopram (CELEXA) 20 mg, Oral, Every morning   • Doxepin HCl 6 mg, Oral, Daily at bedtime, TAKING 12mg   • Elastic Bandages & Supports (B-3 Extra High Comp Hose Women) MISC Does not apply   • fluticasone (FLONASE) 50 mcg/act nasal spray 1 spray, Nasal, As needed   • gabapentin (NEURONTIN) 300 mg capsule take 1 capsule by mouth three times a day   • Gauze Pads & Dressings (GAUZE PADS 4"X4") 4"X4" PADS Does not apply, Daily   • Multiple Vitamin (Multi Vitamin Daily) TABS Every morning   • omeprazole (PRILOSEC) 20 mg, Oral, Every morning       Social History     Social History Narrative   • Not on file     Social History     Tobacco Use   Smoking Status Former   • Packs/day: 1.50   • Years: 5.00   • Total pack years: 7.50   • Types: Cigarettes   • Start date: 1970   • Quit date: 1972   • Years since quittin.5   • Passive exposure: Current   Smokeless Tobacco Never

## 2023-08-07 ENCOUNTER — APPOINTMENT (OUTPATIENT)
Dept: RADIOLOGY | Facility: CLINIC | Age: 73
End: 2023-08-07
Payer: COMMERCIAL

## 2023-08-07 DIAGNOSIS — Z98.890 S/P BREAST RECONSTRUCTION, BILATERAL: ICD-10-CM

## 2023-08-07 DIAGNOSIS — C50.919 MALIGNANT NEOPLASM OF FEMALE BREAST, UNSPECIFIED ESTROGEN RECEPTOR STATUS, UNSPECIFIED LATERALITY, UNSPECIFIED SITE OF BREAST (HCC): ICD-10-CM

## 2023-08-07 DIAGNOSIS — Z98.890 S/P BREAST RECONSTRUCTION: ICD-10-CM

## 2023-08-07 PROCEDURE — 71046 X-RAY EXAM CHEST 2 VIEWS: CPT

## 2023-08-08 ENCOUNTER — APPOINTMENT (OUTPATIENT)
Dept: LAB | Facility: CLINIC | Age: 73
End: 2023-08-08
Payer: COMMERCIAL

## 2023-08-08 DIAGNOSIS — C50.919 MALIGNANT NEOPLASM OF FEMALE BREAST, UNSPECIFIED ESTROGEN RECEPTOR STATUS, UNSPECIFIED LATERALITY, UNSPECIFIED SITE OF BREAST (HCC): ICD-10-CM

## 2023-08-08 DIAGNOSIS — Z98.890 S/P BREAST RECONSTRUCTION: ICD-10-CM

## 2023-08-08 LAB
ANION GAP SERPL CALCULATED.3IONS-SCNC: 6 MMOL/L
BUN SERPL-MCNC: 16 MG/DL (ref 5–25)
CALCIUM SERPL-MCNC: 9.1 MG/DL (ref 8.3–10.1)
CHLORIDE SERPL-SCNC: 106 MMOL/L (ref 96–108)
CO2 SERPL-SCNC: 28 MMOL/L (ref 21–32)
CREAT SERPL-MCNC: 1.33 MG/DL (ref 0.6–1.3)
ERYTHROCYTE [DISTWIDTH] IN BLOOD BY AUTOMATED COUNT: 14.6 % (ref 11.6–15.1)
GFR SERPL CREATININE-BSD FRML MDRD: 40 ML/MIN/1.73SQ M
GLUCOSE P FAST SERPL-MCNC: 124 MG/DL (ref 65–99)
HCT VFR BLD AUTO: 40.7 % (ref 34.8–46.1)
HGB BLD-MCNC: 13 G/DL (ref 11.5–15.4)
MCH RBC QN AUTO: 27.1 PG (ref 26.8–34.3)
MCHC RBC AUTO-ENTMCNC: 31.9 G/DL (ref 31.4–37.4)
MCV RBC AUTO: 85 FL (ref 82–98)
PLATELET # BLD AUTO: 205 THOUSANDS/UL (ref 149–390)
PMV BLD AUTO: 11.9 FL (ref 8.9–12.7)
POTASSIUM SERPL-SCNC: 4 MMOL/L (ref 3.5–5.3)
RBC # BLD AUTO: 4.79 MILLION/UL (ref 3.81–5.12)
SODIUM SERPL-SCNC: 140 MMOL/L (ref 135–147)
WBC # BLD AUTO: 6.39 THOUSAND/UL (ref 4.31–10.16)

## 2023-08-08 PROCEDURE — NC001 PR NO CHARGE: Performed by: PHYSICIAN ASSISTANT

## 2023-08-08 PROCEDURE — 36415 COLL VENOUS BLD VENIPUNCTURE: CPT

## 2023-08-08 PROCEDURE — 80048 BASIC METABOLIC PNL TOTAL CA: CPT

## 2023-08-08 PROCEDURE — 85027 COMPLETE CBC AUTOMATED: CPT

## 2023-08-08 NOTE — H&P
Assessment/Plan   74-year-old female status post bilateral breast reconstruction with D IEP flaps with fat necrosis of the right breast for bilateral revision with excision of right breast skin paddle, correction of contour deformity, repositioning of flap with fat grafting to right breast, reduction of left breast and revision of abdominal lateral excess  1.)  We discussed the risks, benefits, alternatives  2.)  All her questions were answered to her satisfaction  3.)  Informed consent obtained and signed, will proceed to the OR as scheduled.     Discussion--I discussed with the patient the risks, benefits, turns for surgery as well as the surgical plan. We will proceed with excision of the skin paddle and fat necrosis on the right breast to improve her contour, fat grafting to the right breast for improvement of volume and reduction of the left breast for symmetry, I discussed with the patient the option of fat grafting and the use of her abdominal donor site, we discussed the risks, benefits, alternatives including risk of bleeding, infection, scarring, poor wound healing, damage surrounding underlying structures, need for further surgery, need for multiple procedures, seroma, DVT, under correction, overcorrection, fat necrosis, need for donor site, I did discuss with her that it is highly likely that she will need multiple staged procedures, we discussed the hospital stay, expected recovery time, all her questions were answered to her satisfaction, informed consent obtained and signed, will proceed to the OR as scheduled.     Subjective   Patient ID: Kavya Colorado is a 81 y.o. female.         Vitals:     07/18/23 1300   BP: 170/97   Pulse: 98   Temp: (!) 96 °F (35.6 °C)      HPI  Patient is a 74-year-old female known to me for bilateral breast reconstruction with fat necrosis of her right breast status post D IEP.   She is here today to discuss the upcoming surgical plan, she is unhappy with the size and appearance of her right breast that she feels is much smaller than her left, she is also interested in possible reduction of her left breast, she has no other complaints, she has no changes to her medical history.     The following portions of the patient's history were reviewed and updated as appropriate: allergies, current medications, past family history, past medical history, past social history, past surgical history and problem list.     Review of Systems   All other systems reviewed and are negative.        Objective   Physical Exam   Constitutional  She appears well-developed and well-nourished.      Eyes  Pupils are equal, round, and reactive to light. System normal.      General -             Right: Right eye extraocular movements are normal.             Left: Left eye extraocular movements are normal.      Cardiovascular: Normal rate, regular rhythm and normal heart sounds.      Pulmonary/Chest  Effort normal and breath sounds normal.      Psychiatric  She has a normal mood and affect. Her behavior is normal. Judgment and thought content normal.   Abdomen--soft, nontender, nondistended     Bilateral breast--patient with significant breast asymmetry, her right breast is much smaller than her left, she does have fat necrosis in the pocket and appearance of the skin paddle on the right side, left breast has good shape and contour however is much larger than the right, she has moderate skin excess and standing cutaneous deformity of the bilateral hips.     Medical History        Past Medical History:   Diagnosis Date   • Cancer ([address])     • CPAP (continuous positive airway pressure) dependence     • Depression     • Fracture of nasal bones 05/08/19   • GERD (gastroesophageal reflux disease)     • Restless leg syndrome     • Skin cancer 12/27/16     first time seen at [facility].  Had  issues for several years previous   • Sleep apnea           Surgical History         Past Surgical History:   Procedure Laterality Date   • BILATERAL OOPHORECTOMY       • BREAST RECONSTRUCTION   2022     live   • ELBOW FRACTURE SURGERY       • MASTECTOMY       • [place] BREAST RECONSTRUCTION W/FREE FLAP Bilateral 2022     Procedure: RECONSTRUCTION BREAST W/FLAP FREE DEEP INFERIOR EPIGASTRIC  (LIVE);   Surgeon: Carolee Matute MD;  [facility];  Service: Plastics   • RHINOPLASTY       • SINUS SURGERY       • SKIN CANCER EXCISION   2020     [facility]              Current Outpatient Medications   Medication Instructions   • acetaminophen (TYLENOL) 975 mg, Oral, Every 6 hours scheduled   • Biotin 5,000 mcg, Oral, Every morning   • citalopram (CELEXA) 20 mg, Oral, Every morning   • Doxepin HCl 6 mg, Oral, Daily at bedtime, TAKING 12mg   • Elastic Bandages & Supports (B-3 Extra High Comp Hose Women) MISC Does not apply   • fluticasone (FLONASE) 50 mcg/act nasal spray 1 spray, Nasal, As needed   • gabapentin (NEURONTIN) 300 mg capsule take 1 capsule by mouth three times a day   • Gauze Pads & Dressings (GAUZE PADS 4"X4") 4"X4" PADS Does not apply, Daily   • Multiple Vitamin (Multi Vitamin Daily) TABS Every morning   • omeprazole (PRILOSEC) 20 mg, Oral, Every morning         Social History          Social History Narrative   • Not on file      Social History           Tobacco Use   Smoking Status Former   • Packs/day: 1.50   • Years: 5.00   • Total pack years: 7.50   • Types: Cigarettes   • Start date: 1970   • Quit date: 1972   • Years since quittin.5   • Passive exposure: Current   Smokeless Tobacco Never

## 2023-08-14 ENCOUNTER — ANESTHESIA EVENT (OUTPATIENT)
Dept: PERIOP | Facility: HOSPITAL | Age: 73
End: 2023-08-14
Payer: COMMERCIAL

## 2023-08-14 NOTE — PRE-PROCEDURE INSTRUCTIONS
Pre-Surgery Instructions:   Medication Instructions   • acetaminophen (TYLENOL) 325 mg tablet Uses PRN- OK to take day of surgery   • citalopram (CeleXA) 20 mg tablet Take day of surgery. • fluticasone (FLONASE) 50 mcg/act nasal spray Uses PRN- DO NOT take day of surgery   • gabapentin (NEURONTIN) 300 mg capsule Hold day of surgery. takes afternoon and HS   • Multiple Vitamin (Multi Vitamin Daily) TABS Stop taking 7 days prior to surgery. • omeprazole (PriLOSEC) 20 mg delayed release capsule Take day of surgery. Medication instructions for day surgery reviewed. Please use only a sip of water to take your instructed medications. Avoid all over the counter vitamins, supplements and NSAIDS for one week prior to surgery per anesthesia guidelines. Tylenol is ok to take as needed. You will receive a call one business day prior to surgery with an arrival time and hospital directions. If your surgery is scheduled on a Monday, the hospital will be calling you on the Friday prior to your surgery. If you have not heard from anyone by 8pm, please call the hospital supervisor through the hospital  at [phone]. [facility] [phone]). Do not eat or drink anything after midnight the night before your surgery, including candy, mints, lifesavers, or chewing gum. Do not drink alcohol 24hrs before your surgery. Try not to smoke at least 24hrs before your surgery. Follow the pre surgery showering instructions as listed in the [facility] Surgical Experience Booklet” or otherwise provided by your surgeon's office. Do not shave the surgical area 24 hours before surgery. Do not apply any lotions, creams, including makeup, cologne, deodorant, or perfumes after showering on the day of your surgery. No contact lenses, eye make-up, or artificial eyelashes. Remove nail polish, including gel polish, and any artificial, gel, or acrylic nails if possible. Remove all jewelry including rings and body piercing jewelry. Wear causal clothing that is easy to take on and off. Consider your type of surgery. Keep any valuables, jewelry, piercings at home. Please bring any specially ordered equipment (sling, braces) if indicated. Arrange for a responsible person to drive you to and from the hospital on the day of your surgery. Visitor Guidelines discussed. Call the surgeon's office with any new illnesses, exposures, or additional questions prior to surgery. Please reference your [facility] Surgical Experience Booklet” for additional information to prepare for your upcoming surgery.

## 2023-08-17 ENCOUNTER — ANESTHESIA (OUTPATIENT)
Dept: PERIOP | Facility: HOSPITAL | Age: 73
End: 2023-08-17
Payer: COMMERCIAL

## 2023-08-17 ENCOUNTER — HOSPITAL ENCOUNTER (OUTPATIENT)
Facility: HOSPITAL | Age: 73
Setting detail: OUTPATIENT SURGERY
Discharge: HOME | End: 2023-08-17
Attending: PLASTIC SURGERY | Admitting: PLASTIC SURGERY
Payer: COMMERCIAL

## 2023-08-17 VITALS
DIASTOLIC BLOOD PRESSURE: 62 MMHG | OXYGEN SATURATION: 92 % | BODY MASS INDEX: 33.66 KG/M2 | TEMPERATURE: 97.2 F | RESPIRATION RATE: 18 BRPM | SYSTOLIC BLOOD PRESSURE: 136 MMHG | WEIGHT: 190 LBS | HEIGHT: 63 IN | HEART RATE: 77 BPM

## 2023-08-17 DIAGNOSIS — Z98.890 S/P BREAST RECONSTRUCTION: ICD-10-CM

## 2023-08-17 DIAGNOSIS — C50.919 MALIGNANT NEOPLASM OF FEMALE BREAST, UNSPECIFIED ESTROGEN RECEPTOR STATUS, UNSPECIFIED LATERALITY, UNSPECIFIED SITE OF BREAST (HCC): ICD-10-CM

## 2023-08-17 PROBLEM — F43.21 ADJUSTMENT DISORDER WITH DEPRESSED MOOD: Status: ACTIVE | Noted: 2017-07-27

## 2023-08-17 PROBLEM — G62.0 DRUG-INDUCED POLYNEUROPATHY (HCC): Status: ACTIVE | Noted: 2021-08-16

## 2023-08-17 PROBLEM — K21.00 GASTROESOPHAGEAL REFLUX DISEASE WITH ESOPHAGITIS WITHOUT HEMORRHAGE: Status: ACTIVE | Noted: 2021-08-12

## 2023-08-17 PROBLEM — Z85.3 HISTORY OF BREAST CANCER: Status: ACTIVE | Noted: 2022-12-28

## 2023-08-17 PROBLEM — Z83.3 FAMILY HISTORY OF DIABETES MELLITUS: Status: ACTIVE | Noted: 2023-08-17

## 2023-08-17 PROBLEM — Z80.0 FAMILY HISTORY OF PANCREATIC CANCER: Status: ACTIVE | Noted: 2022-06-29

## 2023-08-17 PROBLEM — Z86.010 HISTORY OF COLON POLYPS: Status: ACTIVE | Noted: 2022-05-27

## 2023-08-17 PROBLEM — G25.81 RESTLESS LEGS SYNDROME: Status: ACTIVE | Noted: 2021-05-18

## 2023-08-17 PROBLEM — Z90.722 S/P BSO (BILATERAL SALPINGO-OOPHORECTOMY): Status: ACTIVE | Noted: 2020-07-31

## 2023-08-17 PROBLEM — Z86.0100 HISTORY OF COLON POLYPS: Status: ACTIVE | Noted: 2022-05-27

## 2023-08-17 PROBLEM — H25.813 COMBINED FORMS OF AGE-RELATED CATARACT, BILATERAL: Status: ACTIVE | Noted: 2020-07-31

## 2023-08-17 PROBLEM — I67.9 CEREBROVASCULAR DISEASE: Status: ACTIVE | Noted: 2021-05-18

## 2023-08-17 PROBLEM — E78.49 OTHER HYPERLIPIDEMIA: Status: ACTIVE | Noted: 2022-05-27

## 2023-08-17 PROBLEM — K22.719 BARRETT'S ESOPHAGUS WITH DYSPLASIA: Status: ACTIVE | Noted: 2021-11-04

## 2023-08-17 PROBLEM — Z90.13 HISTORY OF BILATERAL MASTECTOMY: Status: ACTIVE | Noted: 2021-05-17

## 2023-08-17 PROBLEM — C50.912 BREAST CARCINOMA, FEMALE, LEFT (HCC): Status: ACTIVE | Noted: 2020-12-21

## 2023-08-17 PROCEDURE — 15771 GRFG AUTOL FAT LIPO 50 CC/<: CPT | Performed by: PLASTIC SURGERY

## 2023-08-17 PROCEDURE — 19380 REVJ RECONSTRUCTED BREAST: CPT

## 2023-08-17 PROCEDURE — 88342 IMHCHEM/IMCYTCHM 1ST ANTB: CPT | Performed by: PATHOLOGY

## 2023-08-17 PROCEDURE — 88302 TISSUE EXAM BY PATHOLOGIST: CPT | Performed by: PATHOLOGY

## 2023-08-17 PROCEDURE — 15772 GRFG AUTOL FAT LIPO EA ADDL: CPT | Performed by: PLASTIC SURGERY

## 2023-08-17 PROCEDURE — 99024 POSTOP FOLLOW-UP VISIT: CPT | Performed by: PLASTIC SURGERY

## 2023-08-17 PROCEDURE — 88341 IMHCHEM/IMCYTCHM EA ADD ANTB: CPT | Performed by: PATHOLOGY

## 2023-08-17 PROCEDURE — 88305 TISSUE EXAM BY PATHOLOGIST: CPT | Performed by: PATHOLOGY

## 2023-08-17 PROCEDURE — 15771 GRFG AUTOL FAT LIPO 50 CC/<: CPT

## 2023-08-17 PROCEDURE — 19380 REVJ RECONSTRUCTED BREAST: CPT | Performed by: PLASTIC SURGERY

## 2023-08-17 PROCEDURE — C9290 INJ, BUPIVACAINE LIPOSOME: HCPCS | Performed by: PHYSICIAN ASSISTANT

## 2023-08-17 PROCEDURE — 15772 GRFG AUTOL FAT LIPO EA ADDL: CPT

## 2023-08-17 RX ORDER — HYDROMORPHONE HCL/PF 1 MG/ML
SYRINGE (ML) INJECTION AS NEEDED
Status: DISCONTINUED | OUTPATIENT
Start: 2023-08-17 | End: 2023-08-17

## 2023-08-17 RX ORDER — GABAPENTIN 300 MG/1
300 CAPSULE ORAL ONCE
Status: COMPLETED | OUTPATIENT
Start: 2023-08-17 | End: 2023-08-17

## 2023-08-17 RX ORDER — PROPOFOL 10 MG/ML
INJECTION, EMULSION INTRAVENOUS AS NEEDED
Status: DISCONTINUED | OUTPATIENT
Start: 2023-08-17 | End: 2023-08-17

## 2023-08-17 RX ORDER — CEFAZOLIN SODIUM 2 G/50ML
2000 SOLUTION INTRAVENOUS ONCE
Status: COMPLETED | OUTPATIENT
Start: 2023-08-17 | End: 2023-08-17

## 2023-08-17 RX ORDER — SODIUM CHLORIDE, SODIUM LACTATE, POTASSIUM CHLORIDE, CALCIUM CHLORIDE 600; 310; 30; 20 MG/100ML; MG/100ML; MG/100ML; MG/100ML
INJECTION, SOLUTION INTRAVENOUS CONTINUOUS PRN
Status: DISCONTINUED | OUTPATIENT
Start: 2023-08-17 | End: 2023-08-17

## 2023-08-17 RX ORDER — ACETAMINOPHEN 325 MG/1
975 TABLET ORAL ONCE
Status: COMPLETED | OUTPATIENT
Start: 2023-08-17 | End: 2023-08-17

## 2023-08-17 RX ORDER — GLYCOPYRROLATE 0.2 MG/ML
INJECTION INTRAMUSCULAR; INTRAVENOUS AS NEEDED
Status: DISCONTINUED | OUTPATIENT
Start: 2023-08-17 | End: 2023-08-17

## 2023-08-17 RX ORDER — MIDAZOLAM HYDROCHLORIDE 2 MG/2ML
INJECTION, SOLUTION INTRAMUSCULAR; INTRAVENOUS AS NEEDED
Status: DISCONTINUED | OUTPATIENT
Start: 2023-08-17 | End: 2023-08-17

## 2023-08-17 RX ORDER — CEPHALEXIN 500 MG/1
500 CAPSULE ORAL EVERY 6 HOURS SCHEDULED
Qty: 28 CAPSULE | Refills: 0 | Status: SHIPPED | OUTPATIENT
Start: 2023-08-17 | End: 2023-08-24

## 2023-08-17 RX ORDER — OXYCODONE HYDROCHLORIDE 5 MG/1
5 TABLET ORAL EVERY 6 HOURS PRN
Qty: 15 TABLET | Refills: 0 | Status: SHIPPED | OUTPATIENT
Start: 2023-08-17 | End: 2023-08-27

## 2023-08-17 RX ORDER — ROCURONIUM BROMIDE 10 MG/ML
INJECTION, SOLUTION INTRAVENOUS AS NEEDED
Status: DISCONTINUED | OUTPATIENT
Start: 2023-08-17 | End: 2023-08-17

## 2023-08-17 RX ORDER — LIDOCAINE HYDROCHLORIDE 10 MG/ML
INJECTION, SOLUTION EPIDURAL; INFILTRATION; INTRACAUDAL; PERINEURAL AS NEEDED
Status: DISCONTINUED | OUTPATIENT
Start: 2023-08-17 | End: 2023-08-17

## 2023-08-17 RX ORDER — ENOXAPARIN SODIUM 100 MG/ML
0.4 INJECTION SUBCUTANEOUS DAILY
Qty: 2.8 ML | Refills: 0 | Status: SHIPPED | OUTPATIENT
Start: 2023-08-17 | End: 2023-08-24

## 2023-08-17 RX ORDER — HYDROMORPHONE HCL/PF 1 MG/ML
0.2 SYRINGE (ML) INJECTION
Status: DISCONTINUED | OUTPATIENT
Start: 2023-08-17 | End: 2023-08-17 | Stop reason: HOSPADM

## 2023-08-17 RX ORDER — ONDANSETRON 2 MG/ML
INJECTION INTRAMUSCULAR; INTRAVENOUS AS NEEDED
Status: DISCONTINUED | OUTPATIENT
Start: 2023-08-17 | End: 2023-08-17

## 2023-08-17 RX ORDER — MAGNESIUM HYDROXIDE 1200 MG/15ML
LIQUID ORAL AS NEEDED
Status: DISCONTINUED | OUTPATIENT
Start: 2023-08-17 | End: 2023-08-17 | Stop reason: HOSPADM

## 2023-08-17 RX ORDER — DEXAMETHASONE SODIUM PHOSPHATE 10 MG/ML
INJECTION, SOLUTION INTRAMUSCULAR; INTRAVENOUS AS NEEDED
Status: DISCONTINUED | OUTPATIENT
Start: 2023-08-17 | End: 2023-08-17

## 2023-08-17 RX ORDER — ONDANSETRON 2 MG/ML
4 INJECTION INTRAMUSCULAR; INTRAVENOUS ONCE AS NEEDED
Status: DISCONTINUED | OUTPATIENT
Start: 2023-08-17 | End: 2023-08-17 | Stop reason: HOSPADM

## 2023-08-17 RX ORDER — TRANEXAMIC ACID 10 MG/ML
INJECTION, SOLUTION INTRAVENOUS AS NEEDED
Status: DISCONTINUED | OUTPATIENT
Start: 2023-08-17 | End: 2023-08-17

## 2023-08-17 RX ORDER — SODIUM CHLORIDE 9 MG/ML
125 INJECTION, SOLUTION INTRAVENOUS CONTINUOUS
Status: DISCONTINUED | OUTPATIENT
Start: 2023-08-17 | End: 2023-08-17 | Stop reason: HOSPADM

## 2023-08-17 RX ORDER — FENTANYL CITRATE 50 UG/ML
INJECTION, SOLUTION INTRAMUSCULAR; INTRAVENOUS AS NEEDED
Status: DISCONTINUED | OUTPATIENT
Start: 2023-08-17 | End: 2023-08-17

## 2023-08-17 RX ORDER — ENOXAPARIN SODIUM 100 MG/ML
40 INJECTION SUBCUTANEOUS ONCE
Status: COMPLETED | OUTPATIENT
Start: 2023-08-17 | End: 2023-08-17

## 2023-08-17 RX ORDER — CYCLOBENZAPRINE HCL 5 MG
5 TABLET ORAL 3 TIMES DAILY PRN
Qty: 30 TABLET | Refills: 0 | Status: SHIPPED | OUTPATIENT
Start: 2023-08-17 | End: 2023-08-27

## 2023-08-17 RX ADMIN — LIDOCAINE HYDROCHLORIDE 50 MG: 10 INJECTION, SOLUTION EPIDURAL; INFILTRATION; INTRACAUDAL; PERINEURAL at 09:03

## 2023-08-17 RX ADMIN — SODIUM CHLORIDE, SODIUM LACTATE, POTASSIUM CHLORIDE, AND CALCIUM CHLORIDE: .6; .31; .03; .02 INJECTION, SOLUTION INTRAVENOUS at 08:36

## 2023-08-17 RX ADMIN — ACETAMINOPHEN 975 MG: 325 TABLET ORAL at 08:40

## 2023-08-17 RX ADMIN — MIDAZOLAM 2 MG: 1 INJECTION INTRAMUSCULAR; INTRAVENOUS at 08:56

## 2023-08-17 RX ADMIN — ONDANSETRON 4 MG: 2 INJECTION INTRAMUSCULAR; INTRAVENOUS at 11:54

## 2023-08-17 RX ADMIN — FENTANYL CITRATE 50 MCG: 50 INJECTION, SOLUTION INTRAMUSCULAR; INTRAVENOUS at 09:00

## 2023-08-17 RX ADMIN — PROPOFOL 150 MG: 10 INJECTION, EMULSION INTRAVENOUS at 09:03

## 2023-08-17 RX ADMIN — HYDROMORPHONE HYDROCHLORIDE 0.5 MG: 1 INJECTION, SOLUTION INTRAMUSCULAR; INTRAVENOUS; SUBCUTANEOUS at 10:50

## 2023-08-17 RX ADMIN — GLYCOPYRROLATE 0.2 MG: 0.2 INJECTION INTRAMUSCULAR; INTRAVENOUS at 11:40

## 2023-08-17 RX ADMIN — GLYCOPYRROLATE 0.2 MG: 0.2 INJECTION INTRAMUSCULAR; INTRAVENOUS at 09:44

## 2023-08-17 RX ADMIN — TRANEXAMIC ACID 1000 MG: 10 INJECTION, SOLUTION INTRAVENOUS at 11:12

## 2023-08-17 RX ADMIN — FENTANYL CITRATE 100 MCG: 50 INJECTION, SOLUTION INTRAMUSCULAR; INTRAVENOUS at 09:38

## 2023-08-17 RX ADMIN — CEFAZOLIN SODIUM 2000 MG: 2 SOLUTION INTRAVENOUS at 09:01

## 2023-08-17 RX ADMIN — GABAPENTIN 300 MG: 300 CAPSULE ORAL at 08:40

## 2023-08-17 RX ADMIN — DEXAMETHASONE SODIUM PHOSPHATE 10 MG: 10 INJECTION, SOLUTION INTRAMUSCULAR; INTRAVENOUS at 09:17

## 2023-08-17 RX ADMIN — ROCURONIUM BROMIDE 50 MG: 10 INJECTION, SOLUTION INTRAVENOUS at 09:03

## 2023-08-17 RX ADMIN — FENTANYL CITRATE 50 MCG: 50 INJECTION, SOLUTION INTRAMUSCULAR; INTRAVENOUS at 09:18

## 2023-08-17 RX ADMIN — ENOXAPARIN SODIUM 40 MG: 40 INJECTION SUBCUTANEOUS at 08:40

## 2023-08-17 RX ADMIN — FENTANYL CITRATE 100 MCG: 50 INJECTION, SOLUTION INTRAMUSCULAR; INTRAVENOUS at 10:22

## 2023-08-17 RX ADMIN — HYDROMORPHONE HYDROCHLORIDE 0.5 MG: 1 INJECTION, SOLUTION INTRAMUSCULAR; INTRAVENOUS; SUBCUTANEOUS at 10:40

## 2023-08-17 NOTE — OP NOTE
OPERATIVE REPORT  PATIENT NAME: Lamonte Turner    :  1950  MRN: [id number]  Pt Location: [facility] ROOM 01    SURGERY DATE: 2023    Surgeon(s) and Role:     * Henok Sosa MD - Primary     * Juma Wade PA-C - Assisting    Preop Diagnosis:  Bilateral breast asymmetry with contour deformity of the right breast, fat necrosis of the right breast flap, relative hypomastia, breast asymmetry, lateral breast fullness with relative macromastia of the left breast, excess skin of abdomen and lateral abdominal excess status post abdominal flap harvest, localized adiposity, contour deformity of the right breast      Post-Operative Diagnosis: Same    Procedure(s):  Tertiary breast reconstruction, revision breast reconstruction, with excision of extensive fat necrosis of right breast, removal of skin paddle, mastopexy of the right breast, elevation of flap, repositioning of the flap with breast reduction and flap debulking of lateral breast excess left breast with local advancement flap 10 cm x 10 cm, fat grafting to right breast 160 mL of Lipo aspirate, correction of lateral standing cutaneous deformity and lateral abdominal excess skin with local advancement flaps, 15 cm x 7 cm right lower abdomen, 15 cm x 7 cm left lower abdomen, placement of kellie none DME the negative pressure wound therapy dressing less than 50 cm²    Specimen(s):  ID Type Source Tests Collected by Time Destination   1 : RIGHT BREAST SKIN & TISSUE Tissue Breast, Right TISSUE EXAM Henok Sosa MD 2023  9:49 AM    2 : RIGHT FLAP TISSUE Tissue Breast, Right TISSUE EXAM Henok Sosa MD 2023 10:52 AM    3 : LEFT ABD TISSUE Tissue Abdominal TISSUE EXAM Henok Sosa MD 2023 11:20 AM    4 : RIGHT ABD TISSUE Tissue Abdominal TISSUE EXAM Henok Sosa MD 2023 11:30 AM        Estimated Blood Loss:   Minimal    Drains:  Closed/Suction Drain Lateral;Left LLQ Bulb 19 Fr.  (Active)   Number of days: 260       Closed/Suction Drain Lateral;Left Breast Bulb 15 Fr. (Active)   Number of days: 260       Closed/Suction Drain Inferior; Lateral;Right Breast Bulb 15 Fr. (Active)   Number of days: 260       Closed/Suction Drain Lateral RLQ Bulb 19 Fr. (Active)   Number of days: 260       Closed/Suction Drain Left Breast Bulb 15 Fr. (Active)   Site Description Unable to view 08/17/23 1315   Dressing Status Clean;Dry; Intact 08/17/23 1315   Drainage Appearance Bloody 08/17/23 1315   Status To bulb suction 08/17/23 1315   Number of days: 0       Anesthesia Type:   General    Operative Indications:  Malignant neoplasm of female breast, unspecified estrogen receptor status, unspecified laterality, unspecified site of breast ([address]) [C50.919]  S/P breast reconstruction [Z98.890]  AS above    Operative Findings:  DICTATED    Complications:   None    Procedure and Technique:        Patient is a 70-year-old female who is known to me status post bilateral breast reconstruction with D IEP flap.   The patient did suffer some venous congestion and ultimately had some fat necrosis on the right side leading to a contour deformity, and relative hypomastia on the right side, her left breast shape had a very good shape and contour, although she did have some significant relative macromastia with lateral excess skin and tissue on the left lateral breast and upper pole contour deformity on the right breast.  She also had standing cutaneous deformity from the donor site which was quite irritating to the patient on the bilateral hips, we had an extensive discussion regarding her options for revision, I did feel that the patient would benefit from revision as she has significant breast asymmetry, I discussed with her correction of the contour deformity with removal of the skin paddle and a mastopexy type procedure on the right breast followed by a lateral breast reduction for recreation of the anterior axillary fold and improvement of her volumetric asymmetry, I discussed I would also recommend fat grafting to the right breast and correction of the abdominal folds with advancement flaps. We discussed the risks, benefits, alternatives including risk of bleeding, infection, scarring, poor wound healing, damage surrounding underlying structures, need for further surgery, need for multiple procedures, DVT, seroma, poor aesthetic result, need for several further stages, we discussed the need for donor site, the need for multiple surgeries, we discussed the risk of fat absorption, fat necrosis, the likelihood that she would need multiple rounds of fat grafting, we discussed further poor aesthetic result, damage to surrounding underlying structures, continued contour deformity, continued breast asymmetry, all the patient's questions were answered to her satisfaction, informed consent obtained and signed and the patient was brought to the hospital as an outpatient elective basis to have the procedure performed. The patient was given preoperative antibiotics, she was given Lovenox, had her SCDs were activated prior to induction of anesthesia in the preoperative holding area. She was then brought to the OR where she is identified verbally, by site, and by armband on the operating room table prior to induction of anesthesia. After the induction of anesthesia she was prepped and draped in standard surgical fashion, timeout performed the surgical site identified. At this time the procedure began by first addressing the lateral flanks which have been identified as the patient's abdominal donor site. A standard tumescent solution was injected into this area using port sites that were in line with her previous abdominal scar. Once the tumescent solution was infiltrated, attention was then turned to the right breast.  This area had fat necrosis that resulted in significant contour deformity of the skin paddle.   However this area lended itself well to excision with a Wise pattern type mastopexy. A Valles pattern was fashioned using the skin paddle apex as the superior extent. An incision was made with a 15 blade around the skin paddle with an area of fat necrosis that was then excised. The lateral limbs were then de-epithelialized with a 15 blade. The flaps were then elevated in the subcutaneous plane in order to advance the tissue to close the Wise pattern. Dissection was limited only to what was required for closure in order to protect the underlying vasculature of the flap. There were 2 areas of significant fat necrosis in the meridian of the breast that were excised with Bovie electrocautery and sent for specimen. Once this was done, Bovie electrocautery was used to obtain meticulous hemostasis, and the Valles pattern mastopexy was completed by first placing 0 Vicryl into the apex and triple point to anchor the triple point to the breast meridian. The flaps were then advanced and closed with multiple 3-0 Monocryl in the deep dermal layer as well as a strata fix in the deep dermal and subcuticular layer. Once this was done, attention was then turned to the left side, the lateral aspect of the skin flap was incised, and Bovie electrocautery was used to dissect the previous mastectomy flap away from the underlying D IEP flap. This was done to the level of the anterior axillary fold which has been identified and marked at the patient's area of concern. Bovie electrocautery was then used to obtain meticulous hemostasis. The flap was identified readily from the overlying subcutaneous tissue, there was some added bulk in this area in the lateral aspect of the flap was excised with Bovie electrocautery down to the level of the chest wall. Approximately 60 g of tissue was removed in this area. Once this was performed, the patient had significant improvement of the lateral bulk. However the anterior axillary fold was still ill-defined.   At this point, the decision was made to perform advancement flaps of the axillary tissue in order to recreate the anterior axillary fold. This was done by using full-thickness bites of Vicryl in the 6 subcutaneous tissue and dermis to pexied the skin and anchored to the lateral chest wall. This was done with several Vicryl after placing a drain in the axillary space. The total advancement measured approximately 10 cm x 10 cm, however this maneuver did significantly improve the lateral excess as well as better define the anterior axillary fold. The patient was then assessed for symmetry, the patient did have a much improved symmetry, she still have very good shape of the bilateral breast, however the patient did still have significant volumetric asymmetry. At that time the planned fat grafting was performed, using the lipo aspirator system with a small 4 mm cannula, fat was harvested from the bilateral flanks. Approximately 250 mL of injectable fat was harvested, the endpoints of harvest were pinch test as well as return of bloody aspirate, once the fluid was allowed to decant approximately 250 mL of injectable fat remained. At this point the lateral standing cutaneous deformities which has been used as the donor sites were tailor tacked in order to achieve closure and improved standing cutaneous deformity and improve her contour. Once this was performed, advancement flaps were fashioned that measured approximately 15 cm x 7 cm bilaterally. The advancement flaps were created by incising the skin in an elliptical fashion down to the level of the subcutaneous tissue and this tissue was removed. The flaps were then advanced, minimal undermining was required due to the previous liposuction, the flaps were then anchored and advanced in the superficial fascial system with 3-0 Monocryl suture and a 3 oh in the deep dermal and subcuticular layer. The patient had a much improved abdominal shape and contour.   Once this was performed and the fat was processed, fat injection was performed using multiple planes multiple passes introducing a 14-gauge Angiocath into the subcutaneous plane. Using multiple planes and multiple passes in small aliquots of fat, fat injection was performed to the right breast.  160 mL was injected, constant reassessment of shape and symmetry was performed, once 160 mm was performed the patient had very good shape and symmetry to the bilateral breast because of the skin turgor the decision was made to complete the procedure at that time. The port sites were closed previously with a 3-0 Monocryl, the injection sites were closed with Dermabond, because the patient's history of smoking and poor wound healing, a kellie none DME negative pressure wound therapy dressing was applied, the patient tolerated the procedure well and in stable condition was extubated and sent to recovery room without complication. We will monitor her status and she will likely be discharged today. I was present for the entire procedure.     Patient Disposition:  PACU         SIGNATURE: Jayden Veliz MD  DATE: August 17, 2023  TIME: 2:10 PM

## 2023-08-17 NOTE — ANESTHESIA POSTPROCEDURE EVALUATION
Post-Op Assessment Note    CV Status:  Stable  Pain Score: 0    Pain management: adequate     Mental Status:  Sleepy   Hydration Status:  Euvolemic   PONV Controlled:  Controlled   Airway Patency:  Patent      Post Op Vitals Reviewed: Yes      Staff: CRNA         No notable events documented.     BP  158/72   Temp  97.2   Pulse 87   Resp   12   SpO2   96

## 2023-08-17 NOTE — DISCHARGE INSTR - AVS FIRST PAGE
Post-Op Discharge Instructions  Dr[facility]       Your dressings should stay in place until seen in the office. You may sponge bath in 24 hours. Please do not get dressings wet or submerge them in water until seen in the office  Keep LEONARDO dressings on until seen by Dr. Dinora Bella  If the light is blinking green, the battery pack is functioning properly  If the light blinks orange, hit the orange button to re-establish the seal, this will usually correct the problem, if the light continues to blink orange, the dressing will still continue to function  Call the office if the dressing becomes completely saturated    Keep surgical bra on at all times, except to shower. It is ok to remove bra while laying down at rest for short intervals for comfort  After 2 weeks, it is ok to switch to a sports bra. It is recommended to use one that harjit in the front. No underwire bra for 6 weeks. Use ABD pads or a form of padding for extra compression. 3. No strenuous activity or lifting over 10 lbs for a minimum of 4 weeks. No repetitive pushing, pulling or overhead, repetitive arm motions. 4. Please record fluid from both of your drains daily and bring this to your first post-op appointment. After emptying the drain, be sure to squeeze the bulb as you reseal the cap.    5. Your first post-op appointment is scheduled for 8/25 at 1 pm at the [facility] office. 6. Take following medications with a checkmark ([x]) as prescribed, and do not take any pain medication on an empty stomach. [x]Tylenol 975mg, every 6 hours for pain control. [x]Flexeril 10mg, every 8 hours for muscle spasms/pain. [x]Oxycodone, 5mg, 1 tablet every 4 hours, as needed for severe/break-through  pain. [x]Keflex, 500mg, 1 tablet every 8 hours. (Antibiotic)  [x]Lovenox, 40mg injection, once a day. 7. Resume any prior medications at home unless otherwise instructed by Dr. Dinora Bella    8.  You must be off all pain medications and have a clear field of vision before driving. 9. For the next 24 hours:  a. Do not sign any legal documents or operate machinery. b. Have a responsible adult help you.  c. Take it easy & rest.    10. Call Dr. Rai Berman office if any:   a. Obvious bleeding, excessive swelling, or warmth at the site  b. Fever over 101.0°  c. Shortness of breath, severe calf or thigh pain. d. Redness, odor, or pus at the wound. (Some oozing is normal from incision sites and may continue for several days)  e. Persistent vomiting or pain that is not relieved by your medication.

## 2023-08-17 NOTE — ANESTHESIA PREPROCEDURE EVALUATION
Procedure:  BILATERAL REVISION OF BREAST RECONSTRUCTION AND REVISION OF ABDOMINAL DONOR SITE (Bilateral: Breast)  LEFT BREAST REDUCTION FOR SYMMETRY (Left: Breast)  FAT GRAFTING TO RIGHT BREAST (Right: Breast)    Relevant Problems   CARDIO   (+) Other hyperlipidemia      GI/HEPATIC   (+) GERD (gastroesophageal reflux disease)   (+) Gastroesophageal reflux disease with esophagitis without hemorrhage      /RENAL   (+) Chronic kidney disease, stage 3a (HCC)      GYN   (+) Breast cancer (HCC)   (+) Breast carcinoma, female, left (HCC)      NEURO/PSYCH   (+) Dysthymia      Digestive   (+) Booker's esophagus with dysplasia      Nervous and Auditory   (+) Drug-induced polyneuropathy (HCC)      Other   (+) Adjustment disorder with depressed mood   (+) CPAP (continuous positive airway pressure) dependence   (+) Hypersomnia with sleep apnea   (+) Obesity   (+) Prediabetes   (+) Restless legs syndrome   (+) S/P breast reconstruction, bilateral      Lab Results   Component Value Date    SODIUM 140 08/08/2023    K 4.0 08/08/2023     08/08/2023    CO2 28 08/08/2023    AGAP 6 08/08/2023    BUN 16 08/08/2023    CREATININE 1.33 (H) 08/08/2023    GLUC 110 12/04/2022    GLUF 124 (H) 08/08/2023    CALCIUM 9.1 08/08/2023    AST 12 01/11/2023    ALT 19 01/11/2023    ALKPHOS 79 01/11/2023    TP 6.9 01/11/2023    TBILI 0.23 01/11/2023    EGFR 40 08/08/2023     Lab Results   Component Value Date    WBC 6.39 08/08/2023    HGB 13.0 08/08/2023    HCT 40.7 08/08/2023    MCV 85 08/08/2023     08/08/2023         Physical Exam    Airway    Mallampati score: II  TM Distance: >3 FB  Neck ROM: full     Dental       Cardiovascular      Pulmonary      Other Findings        Anesthesia Plan  ASA Score- 2     Anesthesia Type- general with ASA Monitors. Additional Monitors:   Airway Plan: ETT. Plan Factors-Exercise tolerance (METS): >4 METS. Chart reviewed. EKG reviewed. Imaging results reviewed. Existing labs reviewed. Patient summary reviewed. Induction- intravenous. Postoperative Plan-     Informed Consent- Anesthetic plan and risks discussed with patient. I personally reviewed this patient with the CRNA. Discussed and agreed on the Anesthesia Plan with the CRNA. Armen Foley

## 2023-08-17 NOTE — H&P
Assessment/Plan   70-year-old female status post bilateral breast reconstruction with D IEP flaps with fat necrosis of the right breast for bilateral revision with excision of right breast skin paddle, correction of contour deformity, repositioning of flap with fat grafting to right breast, reduction of left breast and revision of abdominal lateral excess  1.)  We discussed the risks, benefits, alternatives  2.)  All her questions were answered to her satisfaction  3.)  Informed consent obtained and signed, will proceed to the OR as scheduled.     Discussion--I discussed with the patient the risks, benefits, turns for surgery as well as the surgical plan. We will proceed with excision of the skin paddle and fat necrosis on the right breast to improve her contour, fat grafting to the right breast for improvement of volume and reduction of the left breast for symmetry, I discussed with the patient the option of fat grafting and the use of her abdominal donor site, we discussed the risks, benefits, alternatives including risk of bleeding, infection, scarring, poor wound healing, damage surrounding underlying structures, need for further surgery, need for multiple procedures, seroma, DVT, under correction, overcorrection, fat necrosis, need for donor site, I did discuss with her that it is highly likely that she will need multiple staged procedures, we discussed the hospital stay, expected recovery time, all her questions were answered to her satisfaction, informed consent obtained and signed, will proceed to the OR as scheduled.     Subjective   Patient ID: Brianne Marshall is a 81 y.o. female.         Vitals:     07/18/23 1300   BP: 170/97   Pulse: 98   Temp: (!) 96 °F (35.6 °C)      HPI  Patient is a 70-year-old female known to me for bilateral breast reconstruction with fat necrosis of her right breast status post D IEP.   She is here today to discuss the upcoming surgical plan, she is unhappy with the size and appearance of her right breast that she feels is much smaller than her left, she is also interested in possible reduction of her left breast, she has no other complaints, she has no changes to her medical history.     The following portions of the patient's history were reviewed and updated as appropriate: allergies, current medications, past family history, past medical history, past social history, past surgical history and problem list.     Review of Systems   All other systems reviewed and are negative.        Objective   Physical Exam   Constitutional  She appears well-developed and well-nourished.      Eyes  Pupils are equal, round, and reactive to light. System normal.      General -             Right: Right eye extraocular movements are normal.             Left: Left eye extraocular movements are normal.      Cardiovascular: Normal rate, regular rhythm and normal heart sounds.      Pulmonary/Chest  Effort normal and breath sounds normal.      Psychiatric  She has a normal mood and affect. Her behavior is normal. Judgment and thought content normal.   Abdomen--soft, nontender, nondistended     Bilateral breast--patient with significant breast asymmetry, her right breast is much smaller than her left, she does have fat necrosis in the pocket and appearance of the skin paddle on the right side, left breast has good shape and contour however is much larger than the right, she has moderate skin excess and standing cutaneous deformity of the bilateral hips.     Medical History        Past Medical History:   Diagnosis Date   • Cancer ([address])     • CPAP (continuous positive airway pressure) dependence     • Depression     • Fracture of nasal bones 05/08/19   • GERD (gastroesophageal reflux disease)     • Restless leg syndrome     • Skin cancer 12/27/16     first time seen at [facility].  Had  issues for several years previous   • Sleep apnea           Surgical History         Past Surgical History:   Procedure Laterality Date   • BILATERAL OOPHORECTOMY       • BREAST RECONSTRUCTION   2022     live   • ELBOW FRACTURE SURGERY       • MASTECTOMY       • IA BREAST RECONSTRUCTION W/FREE FLAP Bilateral 2022     Procedure: RECONSTRUCTION BREAST W/FLAP FREE DEEP INFERIOR EPIGASTRIC  (LIVE);   Surgeon: Oli Zabala MD;  Location: [facility];  Service: Plastics   • RHINOPLASTY       • SINUS SURGERY       • SKIN CANCER EXCISION   2020     [facility]              Current Outpatient Medications   Medication Instructions   • acetaminophen (TYLENOL) 975 mg, Oral, Every 6 hours scheduled   • Biotin 5,000 mcg, Oral, Every morning   • citalopram (CELEXA) 20 mg, Oral, Every morning   • Doxepin HCl 6 mg, Oral, Daily at bedtime, TAKING 12mg   • Elastic Bandages & Supports (B-3 Extra High Comp Hose Women) MISC Does not apply   • fluticasone (FLONASE) 50 mcg/act nasal spray 1 spray, Nasal, As needed   • gabapentin (NEURONTIN) 300 mg capsule take 1 capsule by mouth three times a day   • Gauze Pads & Dressings (GAUZE PADS 4"X4") 4"X4" PADS Does not apply, Daily   • Multiple Vitamin (Multi Vitamin Daily) TABS Every morning   • omeprazole (PRILOSEC) 20 mg, Oral, Every morning         Social History          Social History Narrative   • Not on file      Social History           Tobacco Use   Smoking Status Former   • Packs/day: 1.50   • Years: 5.00   • Total pack years: 7.50   • Types: Cigarettes   • Start date: 1970   • Quit date: 1972   • Years since quittin.5   • Passive exposure: Current   Smokeless Tobacco Never

## 2023-08-23 PROCEDURE — 88305 TISSUE EXAM BY PATHOLOGIST: CPT | Performed by: PATHOLOGY

## 2023-08-23 PROCEDURE — 88302 TISSUE EXAM BY PATHOLOGIST: CPT | Performed by: PATHOLOGY

## 2023-08-23 PROCEDURE — 88342 IMHCHEM/IMCYTCHM 1ST ANTB: CPT | Performed by: PATHOLOGY

## 2023-08-23 PROCEDURE — 88341 IMHCHEM/IMCYTCHM EA ADD ANTB: CPT | Performed by: PATHOLOGY

## 2023-08-25 ENCOUNTER — OFFICE VISIT (OUTPATIENT)
Dept: PLASTIC SURGERY | Facility: CLINIC | Age: 73
End: 2023-08-25

## 2023-08-25 DIAGNOSIS — Z98.890 S/P BREAST RECONSTRUCTION, BILATERAL: Primary | ICD-10-CM

## 2023-08-25 PROCEDURE — 99024 POSTOP FOLLOW-UP VISIT: CPT | Performed by: PHYSICIAN ASSISTANT

## 2023-08-25 NOTE — PROGRESS NOTES
Subjective:    Patient ID: Ramiro Carson is a 72 y.o. female. HPI The patient presents s/p tertiary breast reconstruction, revision breast reconstruction, with excision of extensive fat necrosis of right breast, removal of skin paddle, mastopexy of the right breast, elevation of flap, repositioning of the flap with breast reduction and flap debulking of lateral breast excess left breast with local advancement flap 10 cm x 10 cm, fat grafting to right breast 160 mL of Lipo aspirate, correction of lateral standing cutaneous deformity and lateral abdominal excess skin with local advancement flaps, 15 cm x 7 cm right lower abdomen, 15 cm x 7 cm left lower abdomen, placement of kellie none DME the negative pressure wound therapy dressing less than 50 cm² on 8/17/23. The patient states that she is doing great. She has been wearing compression, following activity restrictions, and taking postoperative medications as prescribed. She reports her left breast drain has barely any output. She denies any significant pain, fever, chills, or bleeding. She does report some swelling and bruising.       Past Medical History:   Diagnosis Date   • Arthritis     knee and hands   • Cancer ([address])     Breast--per pt "bracca 2 gene mutation"   • CPAP (continuous positive airway pressure) dependence    • Dental crowns present    • Depression     "chronic"   • Environmental and seasonal allergies     "tested positive for dander-pollen -per pt"   • Fracture of nasal bones 05/08/19   • GERD (gastroesophageal reflux disease)    • History of cancer chemotherapy    • Limb alert care status     No BP/IV Left Arm   • Prediabetes    • Restless leg syndrome    • Skin cancer 12/27/16    first time seen at [facility].  Had  issues for several years previous   • Sleep apnea    • Wears glasses        Patient Active Problem List   Diagnosis   • Chronic kidney disease, stage 3a ([address])   • Prediabetes   • Obesity   • GERD (gastroesophageal reflux disease)   • CPAP (continuous positive airway pressure) dependence   • Admission for breast reconstruction following mastectomy   • Breast cancer ([address])   • S/P breast reconstruction, bilateral   • Adjustment disorder with depressed mood   • Booker's esophagus with dysplasia   • Breast carcinoma, female, left ([address])   • Cerebrovascular disease   • Combined forms of age-related cataract, bilateral   • Drug-induced polyneuropathy ([address])   • Dysthymia   • Family history of diabetes mellitus   • Family history of osteoporosis in mother   • Family history of pancreatic cancer   • Gastroesophageal reflux disease with esophagitis without hemorrhage   • History of bilateral mastectomy   • History of breast cancer   • Restless legs syndrome   • S/P BSO (bilateral salpingo-oophorectomy)   • Other hyperlipidemia   • History of colon polyps   • Hypersomnia with sleep apnea         Current Outpatient Medications:   •  acetaminophen (TYLENOL) 325 mg tablet, Take 3 tablets (975 mg total) by mouth every 6 (six) hours, Disp: 30 tablet, Rfl: 1  •  citalopram (CeleXA) 20 mg tablet, Take 20 mg by mouth every morning, Disp: , Rfl:   •  cyclobenzaprine (FLEXERIL) 5 mg tablet, Take 1 tablet (5 mg total) by mouth 3 (three) times a day as needed for muscle spasms for up to 10 days, Disp: 30 tablet, Rfl: 0  •  Elastic Bandages & Supports (B-3 Extra High Comp Hose Women) MISC, Use, Disp: , Rfl:   •  enoxaparin (Lovenox) 40 mg/0.4 mL, Inject 0.4 mL (40 mg total) under the skin in the morning for 7 days, Disp: 2.8 mL, Rfl: 0  •  fluticasone (FLONASE) 50 mcg/act nasal spray, 1 spray into each nostril if needed, Disp: , Rfl:   •  gabapentin (NEURONTIN) 300 mg capsule, take 1 capsule by mouth three times a day (Patient taking differently: 400 mg 2 (two) times a day Pt takes in afternoon and HS), Disp: 60 capsule, Rfl: 0  •  Gauze Pads & Dressings (GAUZE PADS 4"X4") 4"X4" PADS, Use in the morning, Disp: 25 each, Rfl: 0  •  Multiple Vitamin (Multi Vitamin Daily) TABS, every morning, Disp: , Rfl:   •  omeprazole (PriLOSEC) 20 mg delayed release capsule, Take 20 mg by mouth every morning, Disp: , Rfl:   •  oxyCODONE (Roxicodone) 5 immediate release tablet, Take 1 tablet (5 mg total) by mouth every 6 (six) hours as needed for moderate pain for up to 10 days Max Daily Amount: 20 mg, Disp: 15 tablet, Rfl: 0    Past Surgical History:   Procedure Laterality Date   • BILATERAL OOPHORECTOMY     • BREAST RECONSTRUCTION  2022    live   • CATARACT EXTRACTION Right    • DILATION AND CURETTAGE OF UTERUS     • ELBOW FRACTURE SURGERY Left     elbow with titanium implant   • MASTECTOMY     • WI BREAST RECONSTRUCTION W/FREE FLAP Bilateral 2022    Procedure: RECONSTRUCTION BREAST W/FLAP FREE DEEP INFERIOR EPIGASTRIC  (LIVE); Surgeon: Stefany Muñiz MD;  Location:  MAIN OR;  Service: Plastics   • WI BREAST REDUCTION Left 2023    Procedure: LEFT BREAST REDUCTION FOR SYMMETRY;  Surgeon: Stefany Muñiz MD;  Location: [facility];  Service: Plastics   • WI GRAFTING OF AUTOLOGOUS FAT BY LIPO 50 CC OR LESS Right 2023    Procedure: FAT GRAFTING TO RIGHT BREAST;  Surgeon: Stefany Muñiz MD;  Location: [facility];  Service: Plastics   • WI REVISION OF RECONSTRUCTED BREAST Bilateral 2023    Procedure: BILATERAL REVISION OF BREAST RECONSTRUCTION AND REVISION OF ABDOMINAL DONOR SITE;  Surgeon: Stefany Muñiz MD;  Location: [facility];  Service: Plastics   • RHINOPLASTY     • SINUS SURGERY     • SKIN CANCER EXCISION  2020    [facility] surgery   • TUBAL LIGATION         Social History     Tobacco Use   • Smoking status: Former     Packs/day: 1.50     Years: 5.00     Total pack years: 7.50     Types: Cigarettes     Start date: 1970     Quit date: 1972     Years since quittin.6     Passive exposure: Current   • Smokeless tobacco: Never   Substance Use Topics   • Alcohol use:  Yes     Alcohol/week: 1.0 standard drink of alcohol     Types: 1 Glasses of wine per week     Comment: occassional use, not weekly         Review of Systems    Per HPI       Objective: There were no vitals taken for this visit. Physical Exam    General: NAD, alert  Breasts: Incisions of bilateral breasts are clean, dry, and intact. There is expected edema and ecchymosis bilaterally, but within normal limits. There is no evidence of hematoma or seroma formation. There is no incisional wound breakdown, surrounding erythema, or signs of infection. Left breast drain removed without difficulty. Abdomen: Incisions are clean, dry, and intact. There is expected edema and ecchymosis bilaterally, but within normal limits. There is no evidence of hematoma or seroma formation. There is no incisional wound breakdown, surrounding erythema, or signs of infection. Assessment/Plan: 61-year-old female status post bilateral breast reconstruction      RTO about 2 weeks for recheck. Advised patient to continue wearing compression around-the-clock and reviewed activity restrictions. Patient may shower in 24 hours and begin twice daily application of antibiotic ointment to all incisions. Advised to call sooner with any questions or concerns.       Vipul Loo PA-C  Plastic & Reconstructive Surgery

## 2023-09-12 ENCOUNTER — OFFICE VISIT (OUTPATIENT)
Dept: PLASTIC SURGERY | Facility: CLINIC | Age: 73
End: 2023-09-12

## 2023-09-12 DIAGNOSIS — Z98.890 S/P BREAST RECONSTRUCTION, BILATERAL: Primary | ICD-10-CM

## 2023-09-12 PROCEDURE — 99024 POSTOP FOLLOW-UP VISIT: CPT | Performed by: PHYSICIAN ASSISTANT

## 2023-09-12 NOTE — PROGRESS NOTES
Subjective:    Patient ID: Stefanie Munoz is a 72 y.o. female. HPI The patient presents s/p tertiary breast reconstruction, revision breast reconstruction, with excision of extensive fat necrosis of right breast, removal of skin paddle, mastopexy of the right breast, elevation of flap, repositioning of the flap with breast reduction and flap debulking of lateral breast excess left breast with local advancement flap 10 cm x 10 cm, fat grafting to right breast 160 mL of Lipo aspirate, correction of lateral standing cutaneous deformity and lateral abdominal excess skin with local advancement flaps, 15 cm x 7 cm right lower abdomen, 15 cm x 7 cm left lower abdomen, placement of kellie none DME the negative pressure wound therapy dressing less than 50 cm² on 8/17/23. The patient reports drainage from bottom of right breast.  She has been wearing compression, following activity restrictions. She denies any significant pain, fever, chills, redness, swelling, or bleeding. She has been applying ointment to her incisions. Past Medical History:   Diagnosis Date   • Arthritis     knee and hands   • Cancer ([address])     Breast--per pt "bracca 2 gene mutation"   • CPAP (continuous positive airway pressure) dependence    • Dental crowns present    • Depression     "chronic"   • Environmental and seasonal allergies     "tested positive for dander-pollen -per pt"   • Fracture of nasal bones 05/08/19   • GERD (gastroesophageal reflux disease)    • History of cancer chemotherapy    • Limb alert care status     No BP/IV Left Arm   • Prediabetes    • Restless leg syndrome    • Skin cancer 12/27/16    first time seen at [facility].  Had  issues for several years previous   • Sleep apnea    • Wears glasses        Patient Active Problem List   Diagnosis   • Chronic kidney disease, stage 3a ([address])   • Prediabetes   • Obesity   • GERD (gastroesophageal reflux disease)   • CPAP (continuous positive airway pressure) dependence   • Admission for breast reconstruction following mastectomy   • Breast cancer ([address])   • S/P breast reconstruction, bilateral   • Adjustment disorder with depressed mood   • Booker's esophagus with dysplasia   • Breast carcinoma, female, left ([address])   • Cerebrovascular disease   • Combined forms of age-related cataract, bilateral   • Drug-induced polyneuropathy ([address])   • Dysthymia   • Family history of diabetes mellitus   • Family history of osteoporosis in mother   • Family history of pancreatic cancer   • Gastroesophageal reflux disease with esophagitis without hemorrhage   • History of bilateral mastectomy   • History of breast cancer   • Restless legs syndrome   • S/P BSO (bilateral salpingo-oophorectomy)   • Other hyperlipidemia   • History of colon polyps   • Hypersomnia with sleep apnea         Current Outpatient Medications:   •  acetaminophen (TYLENOL) 325 mg tablet, Take 3 tablets (975 mg total) by mouth every 6 (six) hours, Disp: 30 tablet, Rfl: 1  •  citalopram (CeleXA) 20 mg tablet, Take 20 mg by mouth every morning, Disp: , Rfl:   •  cyclobenzaprine (FLEXERIL) 5 mg tablet, Take 1 tablet (5 mg total) by mouth 3 (three) times a day as needed for muscle spasms for up to 10 days, Disp: 30 tablet, Rfl: 0  •  Elastic Bandages & Supports (B-3 Extra High Comp Hose Women) MISC, Use, Disp: , Rfl:   •  enoxaparin (Lovenox) 40 mg/0.4 mL, Inject 0.4 mL (40 mg total) under the skin in the morning for 7 days, Disp: 2.8 mL, Rfl: 0  •  fluticasone (FLONASE) 50 mcg/act nasal spray, 1 spray into each nostril if needed, Disp: , Rfl:   •  gabapentin (NEURONTIN) 300 mg capsule, take 1 capsule by mouth three times a day (Patient taking differently: 400 mg 2 (two) times a day Pt takes in afternoon and HS), Disp: 60 capsule, Rfl: 0  •  Gauze Pads & Dressings (GAUZE PADS 4"X4") 4"X4" PADS, Use in the morning, Disp: 25 each, Rfl: 0  •  Multiple Vitamin (Multi Vitamin Daily) TABS, every morning, Disp: , Rfl:   •  omeprazole (PriLOSEC) 20 mg delayed release capsule, Take 20 mg by mouth every morning, Disp: , Rfl:     Past Surgical History:   Procedure Laterality Date   • BILATERAL OOPHORECTOMY     • BREAST RECONSTRUCTION  2022    live   • CATARACT EXTRACTION Right    • DILATION AND CURETTAGE OF UTERUS     • ELBOW FRACTURE SURGERY Left     elbow with titanium implant   • MASTECTOMY     • CT BREAST RECONSTRUCTION W/FREE FLAP Bilateral 2022    Procedure: RECONSTRUCTION BREAST W/FLAP FREE DEEP INFERIOR EPIGASTRIC  (LIVE); Surgeon: Lucretia Jon MD;  [facility]: [facility];  Service: Plastics   • CT BREAST REDUCTION Left 2023    Procedure: LEFT BREAST REDUCTION FOR SYMMETRY;  Surgeon: Lucretia Jon MD;  Location: [facility];  Service: Plastics   • CT GRAFTING OF AUTOLOGOUS FAT BY LIPO 50 CC OR LESS Right 2023    Procedure: FAT GRAFTING TO RIGHT BREAST;  Surgeon: Lucretia Jon MD;  [facility]: [facility];  Service: Plastics   • CT REVISION OF RECONSTRUCTED BREAST Bilateral 2023    Procedure: BILATERAL REVISION OF BREAST RECONSTRUCTION AND REVISION OF ABDOMINAL DONOR SITE;  Surgeon: Lucretia Jon MD;  Location: [facility];  Service: Plastics   • RHINOPLASTY     • SINUS SURGERY     • SKIN CANCER EXCISION  2020    [facility]   • TUBAL LIGATION         Social History     Tobacco Use   • Smoking status: Former     Packs/day: 1.50     Years: 5.00     Total pack years: 7.50     Types: Cigarettes     Start date: 1970     Quit date: 1972     Years since quittin.7     Passive exposure: Current   • Smokeless tobacco: Never   Substance Use Topics   • Alcohol use: Yes     Alcohol/week: 1.0 standard drink of alcohol     Types: 1 Glasses of wine per week     Comment: occassional use, not weekly       Review of Systems    Per HPI     Objective: There were no vitals taken for this visit.    Physical Exam    General: NAD, alert    Breasts: 5mm x 5mm wound at the base of the right breast, which tunnels superiorly when probed. Serous drainage draining from wound. Incisions of bilateral breasts are clean, dry, and intact. There is expected edema and ecchymosis bilaterally, but within normal limits. There is no evidence of hematoma or seroma formation. There is no incisional wound breakdown, surrounding erythema, or signs of infection. Abdomen: Incisions are clean, dry, and intact. There is expected edema and ecchymosis bilaterally, but within normal limits. There is no evidence of hematoma or seroma formation. There is no incisional wound breakdown, surrounding erythema, or signs of infection. Assessment/Plan: 27-year-old female status post bilateral breast reconstruction      Patient also seen by Dr. Zahira Garcia today. Wound packed with packing gauze. Advised patient to do this 1-2 daily and pad with ABD. RTO about 2 weeks for recheck. Advised patient to continue wearing compression around-the-clock. Advised to call sooner with any questions or concerns, or worsening of wound.       Kenney Akers PA-C  Plastic & Reconstructive Surgery

## 2023-09-26 ENCOUNTER — OFFICE VISIT (OUTPATIENT)
Dept: PLASTIC SURGERY | Facility: CLINIC | Age: 73
End: 2023-09-26

## 2023-09-26 DIAGNOSIS — Z98.890 S/P BREAST RECONSTRUCTION, BILATERAL: Primary | ICD-10-CM

## 2023-09-26 PROCEDURE — 99024 POSTOP FOLLOW-UP VISIT: CPT | Performed by: PHYSICIAN ASSISTANT

## 2023-09-26 NOTE — PROGRESS NOTES
Subjective:    Patient ID: Trenton Brewer is a 72 y.o. female. HPI The patient presents s/p tertiary breast reconstruction, revision breast reconstruction, with excision of extensive fat necrosis of right breast, removal of skin paddle, mastopexy of the right breast, elevation of flap, repositioning of the flap with breast reduction and flap debulking of lateral breast excess left breast with local advancement flap 10 cm x 10 cm, fat grafting to right breast 160 mL of Lipo aspirate, correction of lateral standing cutaneous deformity and lateral abdominal excess skin with local advancement flaps, 15 cm x 7 cm right lower abdomen, 15 cm x 7 cm left lower abdomen, placement of kellie none DME the negative pressure wound therapy dressing less than 50 cm² on 8/17/23. The patient reports wound and drainage from bottom of right breast has resolved, she can no longer pack the wound. She has been wearing compression, following activity restrictions. She denies any significant pain, fever, chills, redness, swelling, or bleeding. She has no complaints today. Past Medical History:   Diagnosis Date   • Arthritis     knee and hands   • Cancer ([address])     Breast--per pt "bracca 2 gene mutation"   • CPAP (continuous positive airway pressure) dependence    • Dental crowns present    • Depression     "chronic"   • Environmental and seasonal allergies     "tested positive for dander-pollen -per pt"   • Fracture of nasal bones 05/08/19   • GERD (gastroesophageal reflux disease)    • History of cancer chemotherapy    • Limb alert care status     No BP/IV Left Arm   • Prediabetes    • Restless leg syndrome    • Skin cancer 12/27/16    first time seen at [place].  Had  issues for several years previous   • Sleep apnea    • Wears glasses        Patient Active Problem List   Diagnosis   • Chronic kidney disease, stage 3a ([address])   • Prediabetes   • Obesity   • GERD (gastroesophageal reflux disease)   • CPAP (continuous positive airway pressure) dependence   • Admission for breast reconstruction following mastectomy   • Breast cancer ([address])   • S/P breast reconstruction, bilateral   • Adjustment disorder with depressed mood   • Booker's esophagus with dysplasia   • Breast carcinoma, female, left ([address])   • Cerebrovascular disease   • Combined forms of age-related cataract, bilateral   • Drug-induced polyneuropathy ([address])   • Dysthymia   • Family history of diabetes mellitus   • Family history of osteoporosis in mother   • Family history of pancreatic cancer   • Gastroesophageal reflux disease with esophagitis without hemorrhage   • History of bilateral mastectomy   • History of breast cancer   • Restless legs syndrome   • S/P BSO (bilateral salpingo-oophorectomy)   • Other hyperlipidemia   • History of colon polyps   • Hypersomnia with sleep apnea         Current Outpatient Medications:   •  acetaminophen (TYLENOL) 325 mg tablet, Take 3 tablets (975 mg total) by mouth every 6 (six) hours, Disp: 30 tablet, Rfl: 1  •  citalopram (CeleXA) 20 mg tablet, Take 20 mg by mouth every morning, Disp: , Rfl:   •  cyclobenzaprine (FLEXERIL) 5 mg tablet, Take 1 tablet (5 mg total) by mouth 3 (three) times a day as needed for muscle spasms for up to 10 days, Disp: 30 tablet, Rfl: 0  •  Elastic Bandages & Supports (B-3 Extra High Comp Hose Women) MISC, Use, Disp: , Rfl:   •  enoxaparin (Lovenox) 40 mg/0.4 mL, Inject 0.4 mL (40 mg total) under the skin in the morning for 7 days, Disp: 2.8 mL, Rfl: 0  •  fluticasone (FLONASE) 50 mcg/act nasal spray, 1 spray into each nostril if needed, Disp: , Rfl:   •  gabapentin (NEURONTIN) 300 mg capsule, take 1 capsule by mouth three times a day (Patient taking differently: 400 mg 2 (two) times a day Pt takes in afternoon and HS), Disp: 60 capsule, Rfl: 0  •  Gauze Pads & Dressings (GAUZE PADS 4"X4") 4"X4" PADS, Use in the morning, Disp: 25 each, Rfl: 0  •  Multiple Vitamin (Multi Vitamin Daily) TABS, every morning, Disp: , Rfl:   •  omeprazole (PriLOSEC) 20 mg delayed release capsule, Take 20 mg by mouth every morning, Disp: , Rfl:     Past Surgical History:   Procedure Laterality Date   • BILATERAL OOPHORECTOMY     • BREAST RECONSTRUCTION  2022    live   • CATARACT EXTRACTION Right    • DILATION AND CURETTAGE OF UTERUS     • ELBOW FRACTURE SURGERY Left     elbow with titanium implant   • MASTECTOMY     • NE BREAST RECONSTRUCTION W/FREE FLAP Bilateral 2022    Procedure: RECONSTRUCTION BREAST W/FLAP FREE DEEP INFERIOR EPIGASTRIC  (LIVE); Surgeon: Meri Osborne MD;  Location:  MAIN OR;  Service: Plastics   • NE BREAST REDUCTION Left 2023    Procedure: LEFT BREAST REDUCTION FOR SYMMETRY;  Surgeon: Meri Osborne MD;  [facility];  Service: Plastics   • NE GRAFTING OF AUTOLOGOUS FAT BY LIPO 50 CC OR LESS Right 2023    Procedure: FAT GRAFTING TO RIGHT BREAST;  Surgeon: Meri Osborne MD;  [facility]: [facility];  Service: Plastics   • NE REVISION OF RECONSTRUCTED BREAST Bilateral 2023    Procedure: BILATERAL REVISION OF BREAST RECONSTRUCTION AND REVISION OF ABDOMINAL DONOR SITE;  Surgeon: Meri Osborne MD;  Location: [facility];  Service: Plastics   • RHINOPLASTY     • SINUS SURGERY     • SKIN CANCER EXCISION  2020    [facility]   • TUBAL LIGATION         Social History     Tobacco Use   • Smoking status: Former     Packs/day: 1.50     Years: 5.00     Total pack years: 7.50     Types: Cigarettes     Start date: 1970     Quit date: 1972     Years since quittin.7     Passive exposure: Current   • Smokeless tobacco: Never   Substance Use Topics   • Alcohol use: Yes     Alcohol/week: 1.0 standard drink of alcohol     Types: 1 Glasses of wine per week     Comment: occassional use, not weekly       Review of Systems    Per HPI     Objective: There were no vitals taken for this visit.    Physical Exam    General: NAD, alert    Breasts: pinpoint wound at base of right breast, with some serous drainage draining from wound. Incisions of bilateral breasts are clean, dry, and intact. There is expected edema and ecchymosis bilaterally, but within normal limits. There is no evidence of hematoma or seroma formation. There is no incisional wound breakdown, surrounding erythema, or signs of infection. Abdomen: Incisions are clean, dry, and intact. There is expected edema and ecchymosis bilaterally, but within normal limits. There is no evidence of hematoma or seroma formation. There is no incisional wound breakdown, surrounding erythema, or signs of infection. Assessment/Plan: 42-year-old female status post bilateral breast reconstruction        Wound no longer able to be packed. Pad with ABD as needed to absorb residual drainage. RTO about 6-8 weeks for recheck. Advised patient to continue wearing compression around-the-clock. Advised to call sooner with any questions or concerns, or worsening of wound. Activity as tolerated. Discussed twice daily scar massage.          Arthur Pandey PA-C  Plastic & Reconstructive Surgery

## 2023-11-27 ENCOUNTER — OFFICE VISIT (OUTPATIENT)
Age: 73
End: 2023-11-27
Payer: COMMERCIAL

## 2023-11-27 VITALS
OXYGEN SATURATION: 98 % | HEART RATE: 68 BPM | WEIGHT: 189 LBS | DIASTOLIC BLOOD PRESSURE: 91 MMHG | TEMPERATURE: 97.6 F | SYSTOLIC BLOOD PRESSURE: 171 MMHG | BODY MASS INDEX: 33.49 KG/M2 | HEIGHT: 63 IN

## 2023-11-27 DIAGNOSIS — Z98.890 S/P BREAST RECONSTRUCTION, BILATERAL: Primary | ICD-10-CM

## 2023-11-27 PROCEDURE — 99213 OFFICE O/P EST LOW 20 MIN: CPT | Performed by: PHYSICIAN ASSISTANT

## 2023-11-27 NOTE — PROGRESS NOTES
Subjective:    Patient ID: Alessio Pino is a 72 y.o. female. HPI The patient presents s/p tertiary breast reconstruction, revision breast reconstruction, with excision of extensive fat necrosis of right breast, removal of skin paddle, mastopexy of the right breast, elevation of flap, repositioning of the flap with breast reduction and flap debulking of lateral breast excess left breast with local advancement flap 10 cm x 10 cm, fat grafting to right breast 160 mL of Lipo aspirate, correction of lateral standing cutaneous deformity and lateral abdominal excess skin with local advancement flaps, 15 cm x 7 cm right lower abdomen, 15 cm x 7 cm left lower abdomen, placement of kellie none DME the negative pressure wound therapy dressing less than 50 cm² on 8/17/23. The patient reports no issues today. She denies any significant pain, wounds, fever, chills, redness, swelling, or bleeding. Past Medical History:   Diagnosis Date    Arthritis     knee and hands    Cancer ([address])     Breast--per pt "bracca 2 gene mutation"    CPAP (continuous positive airway pressure) dependence     Dental crowns present     Depression     "chronic"    Environmental and seasonal allergies     "tested positive for dander-pollen -per pt"    Fracture of nasal bones 05/08/19    GERD (gastroesophageal reflux disease)     History of cancer chemotherapy     Limb alert care status     No BP/IV Left Arm    Prediabetes     Restless leg syndrome     Skin cancer 12/27/16    first time seen at [facility].  Had  issues for several years previous    Sleep apnea     Wears glasses        Patient Active Problem List   Diagnosis    Chronic kidney disease, stage 3a ([address])    Prediabetes    Obesity    GERD (gastroesophageal reflux disease)    CPAP (continuous positive airway pressure) dependence    Admission for breast reconstruction following mastectomy    Breast cancer (HCC)    S/P breast reconstruction, bilateral    Adjustment disorder with depressed mood    Booker's esophagus with dysplasia    Breast carcinoma, female, left ([address])    Cerebrovascular disease    Combined forms of age-related cataract, bilateral    Drug-induced polyneuropathy ([address])    Dysthymia    Family history of diabetes mellitus    Family history of osteoporosis in mother    Family history of pancreatic cancer    Gastroesophageal reflux disease with esophagitis without hemorrhage    History of bilateral mastectomy    History of breast cancer    Restless legs syndrome    S/P BSO (bilateral salpingo-oophorectomy)    Other hyperlipidemia    History of colon polyps    Hypersomnia with sleep apnea         Current Outpatient Medications:     acetaminophen (TYLENOL) 325 mg tablet, Take 3 tablets (975 mg total) by mouth every 6 (six) hours, Disp: 30 tablet, Rfl: 1    citalopram (CeleXA) 20 mg tablet, Take 20 mg by mouth every morning, Disp: , Rfl:     cyclobenzaprine (FLEXERIL) 5 mg tablet, Take 1 tablet (5 mg total) by mouth 3 (three) times a day as needed for muscle spasms for up to 10 days, Disp: 30 tablet, Rfl: 0    Elastic Bandages & Supports (B-3 Extra High Comp Hose Women) MISC, Use, Disp: , Rfl:     enoxaparin (Lovenox) 40 mg/0.4 mL, Inject 0.4 mL (40 mg total) under the skin in the morning for 7 days, Disp: 2.8 mL, Rfl: 0    fluticasone (FLONASE) 50 mcg/act nasal spray, 1 spray into each nostril if needed, Disp: , Rfl:     gabapentin (NEURONTIN) 300 mg capsule, take 1 capsule by mouth three times a day (Patient taking differently: 400 mg 2 (two) times a day Pt takes in afternoon and HS), Disp: 60 capsule, Rfl: 0    Gauze Pads & Dressings (GAUZE PADS 4"X4") 4"X4" PADS, Use in the morning, Disp: 25 each, Rfl: 0    Multiple Vitamin (Multi Vitamin Daily) TABS, every morning, Disp: , Rfl:     omeprazole (PriLOSEC) 20 mg delayed release capsule, Take 20 mg by mouth every morning, Disp: , Rfl:     Past Surgical History:   Procedure Laterality Date    BILATERAL OOPHORECTOMY      BREAST RECONSTRUCTION 2022    live    CATARACT EXTRACTION Right     DILATION AND CURETTAGE OF UTERUS      ELBOW FRACTURE SURGERY Left     elbow with titanium implant    MASTECTOMY      [place] BREAST RECONSTRUCTION W/FREE FLAP Bilateral 2022    Procedure: RECONSTRUCTION BREAST W/FLAP FREE DEEP INFERIOR EPIGASTRIC  (LIVE); Surgeon: Conrado Howard MD;  Location:  MAIN OR;  Service: Plastics    [place] BREAST REDUCTION Left 2023    Procedure: LEFT BREAST REDUCTION FOR SYMMETRY;  Surgeon: Conrado Howard MD;  Location: [facility];  Service: Plastics    [place] GRAFTING OF AUTOLOGOUS FAT BY LIPO 50 CC OR LESS Right 2023    Procedure: FAT GRAFTING TO RIGHT BREAST;  Surgeon: Conrado Howard MD;  Location: [facility];  Service: Plastics    [place] REVISION OF RECONSTRUCTED BREAST Bilateral 2023    Procedure: BILATERAL REVISION OF BREAST RECONSTRUCTION AND REVISION OF ABDOMINAL DONOR SITE;  Surgeon: Conrado Howard MD;  Location: [facility];  Service: Plastics    RHINOPLASTY      SINUS SURGERY      SKIN CANCER EXCISION  2020    [facility]    TUBAL LIGATION         Social History     Tobacco Use    Smoking status: Former     Packs/day: 1.50     Years: 5.00     Total pack years: 7.50     Types: Cigarettes     Start date: 1970     Quit date: 1972     Years since quittin.9     Passive exposure: Current    Smokeless tobacco: Never   Substance Use Topics    Alcohol use: Yes     Alcohol/week: 1.0 standard drink of alcohol     Types: 1 Glasses of wine per week     Comment: occassional use, not weekly       Review of Systems    Per HPI     Objective: There were no vitals taken for this visit. Physical Exam    General: NAD, alert    Breasts: Incisions of bilateral breasts are clean, dry, and intact. There is no evidence of hematoma or seroma formation. There is no incisional wound breakdown, surrounding erythema, or signs of infection. Abdomen: Incisions are clean, dry, and intact. There is no evidence of hematoma or seroma formation. There is no incisional wound breakdown, surrounding erythema, or signs of infection. Assessment/Plan: 28-year-old female status post bilateral breast reconstruction        RTO about 3 months for recheck, will take post-op pictures. Advised to call sooner with any questions or concerns. Activity as tolerated. Discussed twice daily scar massage.          Lori Osman PA-C  Plastic & Reconstructive Surgery

## 2024-02-27 ENCOUNTER — OFFICE VISIT (OUTPATIENT)
Age: 74
End: 2024-02-27

## 2024-02-27 DIAGNOSIS — Z98.890 S/P BREAST RECONSTRUCTION, BILATERAL: Primary | ICD-10-CM

## 2024-02-27 PROCEDURE — 99024 POSTOP FOLLOW-UP VISIT: CPT | Performed by: PHYSICIAN ASSISTANT

## 2024-02-27 NOTE — PROGRESS NOTES
"Subjective:    Patient ID: Jessica Greene is a 73 y.o. female.  HPI The patient presents s/p tertiary breast reconstruction, revision breast reconstruction, with excision of extensive fat necrosis of right breast, removal of skin paddle, mastopexy of the right breast, elevation of flap, repositioning of the flap with breast reduction and flap debulking of lateral breast excess left breast with local advancement flap 10 cm x 10 cm, fat grafting to right breast 160 mL of Lipo aspirate, correction of lateral standing cutaneous deformity and lateral abdominal excess skin with local advancement flaps, 15 cm x 7 cm right lower abdomen, 15 cm x 7 cm left lower abdomen, placement of kellie none DME the negative pressure wound therapy dressing less than 50 cm² on 8/17/23.    The patient reports no issues today. She denies any significant pain, wounds, fever, chills, redness, swelling, or bleeding. Has been utilizing scar care daily.       Past Medical History:   Diagnosis Date    Arthritis     knee and hands    Cancer (HCC)     Breast--per pt \"bracca 2 gene mutation\"    CPAP (continuous positive airway pressure) dependence     Dental crowns present     Depression     \"chronic\"    Environmental and seasonal allergies     \"tested positive for dander-pollen -per pt\"    Fracture of nasal bones 05/08/19    GERD (gastroesophageal reflux disease)     History of cancer chemotherapy     Limb alert care status     No BP/IV Left Arm    Prediabetes     Restless leg syndrome     Skin cancer 12/27/16    first time seen at [facility]. Had  issues for several years previous    Sleep apnea     Wears glasses        Patient Active Problem List   Diagnosis    Chronic kidney disease, stage 3a (HCC)    Prediabetes    Obesity    GERD (gastroesophageal reflux disease)    CPAP (continuous positive airway pressure) dependence    Admission for breast reconstruction following mastectomy    Breast cancer (HCC)    S/P breast reconstruction, bilateral    " "Adjustment disorder with depressed mood    Booker's esophagus with dysplasia    Breast carcinoma, female, left (HCC)    Cerebrovascular disease    Combined forms of age-related cataract, bilateral    Drug-induced polyneuropathy (HCC)    Dysthymia    Family history of diabetes mellitus    Family history of osteoporosis in mother    Family history of pancreatic cancer    Gastroesophageal reflux disease with esophagitis without hemorrhage    History of bilateral mastectomy    History of breast cancer    Restless legs syndrome    S/P BSO (bilateral salpingo-oophorectomy)    Other hyperlipidemia    History of colon polyps    Hypersomnia with sleep apnea         Current Outpatient Medications:     acetaminophen (TYLENOL) 325 mg tablet, Take 3 tablets (975 mg total) by mouth every 6 (six) hours (Patient not taking: Reported on 11/27/2023), Disp: 30 tablet, Rfl: 1    citalopram (CeleXA) 20 mg tablet, Take 20 mg by mouth every morning, Disp: , Rfl:     cyclobenzaprine (FLEXERIL) 5 mg tablet, Take 1 tablet (5 mg total) by mouth 3 (three) times a day as needed for muscle spasms for up to 10 days, Disp: 30 tablet, Rfl: 0    Elastic Bandages & Supports (B-3 Extra High Comp Hose Women) MISC, Use (Patient not taking: Reported on 11/27/2023), Disp: , Rfl:     enoxaparin (Lovenox) 40 mg/0.4 mL, Inject 0.4 mL (40 mg total) under the skin in the morning for 7 days, Disp: 2.8 mL, Rfl: 0    fluticasone (FLONASE) 50 mcg/act nasal spray, 1 spray into each nostril if needed, Disp: , Rfl:     gabapentin (NEURONTIN) 300 mg capsule, take 1 capsule by mouth three times a day (Patient taking differently: 400 mg 2 (two) times a day Pt takes in afternoon and HS), Disp: 60 capsule, Rfl: 0    Gauze Pads & Dressings (GAUZE PADS 4\"X4\") 4\"X4\" PADS, Use in the morning (Patient not taking: Reported on 11/27/2023), Disp: 25 each, Rfl: 0    Multiple Vitamin (Multi Vitamin Daily) TABS, every morning, Disp: , Rfl:     omeprazole (PriLOSEC) 20 mg delayed " release capsule, Take 20 mg by mouth every morning, Disp: , Rfl:     Past Surgical History:   Procedure Laterality Date    BILATERAL OOPHORECTOMY      BREAST RECONSTRUCTION  2022    live    CATARACT EXTRACTION Right     DILATION AND CURETTAGE OF UTERUS      ELBOW FRACTURE SURGERY Left     elbow with titanium implant    MASTECTOMY      [place] BREAST RECONSTRUCTION W/FREE FLAP Bilateral 2022    Procedure: RECONSTRUCTION BREAST W/FLAP FREE DEEP INFERIOR EPIGASTRIC  (LIVE);  Surgeon: Samir Langston MD;  Location: [facility];  Service: Plastics    [place] BREAST REDUCTION Left 2023    Procedure: LEFT BREAST REDUCTION FOR SYMMETRY;  Surgeon: Samir Langston MD;  [facility]: [facility];  Service: Plastics    [place] GRAFTING OF AUTOLOGOUS FAT BY LIPO 50 CC OR LESS Right 2023    Procedure: FAT GRAFTING TO RIGHT BREAST;  Surgeon: Samir Langston MD;  [facility]: [facility];  Service: Plastics    [place] REVISION OF RECONSTRUCTED BREAST Bilateral 2023    Procedure: BILATERAL REVISION OF BREAST RECONSTRUCTION AND REVISION OF ABDOMINAL DONOR SITE;  Surgeon: Samir Langston MD;  [facility]: [facility];  Service: Plastics    RHINOPLASTY      SINUS SURGERY      SKIN CANCER EXCISION  2020    [facility]    TUBAL LIGATION         Social History     Tobacco Use    Smoking status: Former     Current packs/day: 0.00     Average packs/day: 1.5 packs/day for 5.0 years (7.5 ttl pk-yrs)     Types: Cigarettes     Start date: 1970     Quit date: 1972     Years since quittin.1     Passive exposure: Current    Smokeless tobacco: Never   Substance Use Topics    Alcohol use: Yes     Alcohol/week: 1.0 standard drink of alcohol     Types: 1 Glasses of wine per week     Comment: occassional use, not weekly       Review of Systems    Per HPI     Objective:  There were no vitals taken for this visit.   Physical Exam    General: NAD, alert    Breasts: Incisions of bilateral breasts are clean, dry,  and intact. There is no evidence of hematoma or seroma formation.  There is no incisional wound breakdown, surrounding erythema, or signs of infection.     Abdomen: Incisions are clean, dry, and intact. There is no evidence of hematoma or seroma formation.  There is no incisional wound breakdown, surrounding erythema, or signs of infection.      Assessment/Plan: 73-year-old female status post bilateral breast reconstruction        RTO about 6 months for recheck, will take post-op pictures. Pictures were taken today. Advised to call sooner with any questions or concerns. Activity as tolerated. Discussed twice daily scar massage.         Michael Lowe PA-C  Plastic & Reconstructive Surgery

## 2024-09-09 ENCOUNTER — OFFICE VISIT (OUTPATIENT)
Age: 74
End: 2024-09-09
Payer: COMMERCIAL

## 2024-09-09 DIAGNOSIS — Z48.89 ENCOUNTER FOR FOLLOW-UP CARE INVOLVING PLASTIC SURGERY: Primary | ICD-10-CM

## 2024-09-09 PROCEDURE — 99214 OFFICE O/P EST MOD 30 MIN: CPT | Performed by: PHYSICIAN ASSISTANT

## 2024-09-09 NOTE — PROGRESS NOTES
"Subjective:    Patient ID: Jessica Greene is a 74 y.o. female.  HPI The patient presents s/p tertiary breast reconstruction, revision breast reconstruction, with excision of extensive fat necrosis of right breast, removal of skin paddle, mastopexy of the right breast, elevation of flap, repositioning of the flap with breast reduction and flap debulking of lateral breast excess left breast with local advancement flap 10 cm x 10 cm, fat grafting to right breast 160 mL of Lipo aspirate, correction of lateral standing cutaneous deformity and lateral abdominal excess skin with local advancement flaps, 15 cm x 7 cm right lower abdomen, 15 cm x 7 cm left lower abdomen, placement of kellie none DME the negative pressure wound therapy dressing less than 50 cm² on 8/17/23.    The patient is doing well today. She denies any significant pain, wounds, fever, chills, redness, swelling, or bleeding. Has been utilizing scar care daily. She does report residual nerve and muscle related discomfort. She is possibly interested in revision to left breast for symmetry.       Past Medical History:   Diagnosis Date    Arthritis     knee and hands    Cancer (HCC)     Breast--per pt \"bracca 2 gene mutation\"    CPAP (continuous positive airway pressure) dependence     Dental crowns present     Depression     \"chronic\"    Environmental and seasonal allergies     \"tested positive for dander-pollen -per pt\"    Fracture of nasal bones 05/08/19    GERD (gastroesophageal reflux disease)     History of cancer chemotherapy     Limb alert care status     No BP/IV Left Arm    Prediabetes     Restless leg syndrome     Skin cancer 12/27/16    first time seen at [facility]. Had  issues for several years previous    Sleep apnea     Wears glasses        Patient Active Problem List   Diagnosis    Chronic kidney disease, stage 3a (HCC)    Prediabetes    Obesity    GERD (gastroesophageal reflux disease)    CPAP (continuous positive airway pressure) dependence " "   Admission for breast reconstruction following mastectomy    Breast cancer (HCC)    S/P breast reconstruction, bilateral    Adjustment disorder with depressed mood    Booker's esophagus with dysplasia    Breast carcinoma, female, left (HCC)    Cerebrovascular disease    Combined forms of age-related cataract, bilateral    Drug-induced polyneuropathy (HCC)    Dysthymia    Family history of diabetes mellitus    Family history of osteoporosis in mother    Family history of pancreatic cancer    Gastroesophageal reflux disease with esophagitis without hemorrhage    History of bilateral mastectomy    History of breast cancer    Restless legs syndrome    S/P BSO (bilateral salpingo-oophorectomy)    Other hyperlipidemia    History of colon polyps    Hypersomnia with sleep apnea         Current Outpatient Medications:     acetaminophen (TYLENOL) 325 mg tablet, Take 3 tablets (975 mg total) by mouth every 6 (six) hours (Patient not taking: Reported on 11/27/2023), Disp: 30 tablet, Rfl: 1    citalopram (CeleXA) 20 mg tablet, Take 20 mg by mouth every morning, Disp: , Rfl:     cyclobenzaprine (FLEXERIL) 5 mg tablet, Take 1 tablet (5 mg total) by mouth 3 (three) times a day as needed for muscle spasms for up to 10 days, Disp: 30 tablet, Rfl: 0    Elastic Bandages & Supports (B-3 Extra High Comp Hose Women) MISC, Use (Patient not taking: Reported on 11/27/2023), Disp: , Rfl:     enoxaparin (Lovenox) 40 mg/0.4 mL, Inject 0.4 mL (40 mg total) under the skin in the morning for 7 days, Disp: 2.8 mL, Rfl: 0    fluticasone (FLONASE) 50 mcg/act nasal spray, 1 spray into each nostril if needed, Disp: , Rfl:     gabapentin (NEURONTIN) 300 mg capsule, take 1 capsule by mouth three times a day (Patient taking differently: 400 mg 2 (two) times a day Pt takes in afternoon and HS), Disp: 60 capsule, Rfl: 0    Gauze Pads & Dressings (GAUZE PADS 4\"X4\") 4\"X4\" PADS, Use in the morning (Patient not taking: Reported on 11/27/2023), Disp: 25 each, " Rfl: 0    Multiple Vitamin (Multi Vitamin Daily) TABS, every morning, Disp: , Rfl:     omeprazole (PriLOSEC) 20 mg delayed release capsule, Take 20 mg by mouth every morning, Disp: , Rfl:     Past Surgical History:   Procedure Laterality Date    BILATERAL OOPHORECTOMY      BREAST RECONSTRUCTION  2022    live    CATARACT EXTRACTION Right     DILATION AND CURETTAGE OF UTERUS      ELBOW FRACTURE SURGERY Left     elbow with titanium implant    MASTECTOMY      [place] BREAST RECONSTRUCTION W/FREE FLAP Bilateral 2022    Procedure: RECONSTRUCTION BREAST W/FLAP FREE DEEP INFERIOR EPIGASTRIC  (LIVE);  Surgeon: Samir Langston MD;  [facility]: [facility];  Service: Plastics    [place] BREAST REDUCTION Left 2023    Procedure: LEFT BREAST REDUCTION FOR SYMMETRY;  Surgeon: Samir Langston MD;  [facility]: [facility];  Service: Plastics    [place] GRAFTING OF AUTOLOGOUS FAT BY LIPO 50 CC OR LESS Right 2023    Procedure: FAT GRAFTING TO RIGHT BREAST;  Surgeon: Samir Langston MD;  [facility];  Service: Plastics    [place] REVISION OF RECONSTRUCTED BREAST Bilateral 2023    Procedure: BILATERAL REVISION OF BREAST RECONSTRUCTION AND REVISION OF ABDOMINAL DONOR SITE;  Surgeon: Samir Langston MD;  [facility];  Service: Plastics    RHINOPLASTY      SINUS SURGERY      SKIN CANCER EXCISION  2020    [facility]    TUBAL LIGATION         Social History     Tobacco Use    Smoking status: Former     Current packs/day: 0.00     Average packs/day: 1.5 packs/day for 5.0 years (7.5 ttl pk-yrs)     Types: Cigarettes     Start date: 1970     Quit date: 1972     Years since quittin.7     Passive exposure: Current    Smokeless tobacco: Never   Substance Use Topics    Alcohol use: Yes     Alcohol/week: 1.0 standard drink of alcohol     Types: 1 Glasses of wine per week     Comment: occassional use, not weekly       Review of Systems    Per HPI     Objective:  There were no  vitals taken for this visit.   Physical Exam    General: NAD, alert    Breasts: Incisions of bilateral breasts are clean, dry, and intact. There is no evidence of hematoma or seroma formation.  There is no incisional wound breakdown, surrounding erythema, or signs of infection. Left breast with more volume.    Abdomen: Incisions are clean, dry, and intact. There is no evidence of hematoma or seroma formation.  There is no incisional wound breakdown, surrounding erythema, or signs of infection.    Assessment/Plan: 74-year-old female status post bilateral breast reconstruction    Photos taken.  Will discuss patient with Dr. Langston regarding possible surgical plan for patient for revision of left breast for symmetry.  Otherwise patient is overall well-healing, continue twice daily scar massage.  Will be in touch with the patient and see her back for preoperative appointment if she decides to move forward with surgery.    I have spent a total time of 30 minutes in caring for this patient on the day of the visit/encounter including Patient and family education, Impressions, Counseling / Coordination of care, Documenting in the medical record, Obtaining or reviewing history  , and Communicating with other healthcare professionals .     Michale Lowe PA-C  Plastic & Reconstructive Surgery

## 2024-12-20 ENCOUNTER — TELEPHONE (OUTPATIENT)
Dept: PLASTIC SURGERY | Facility: CLINIC | Age: 74
End: 2024-12-20

## 2024-12-20 NOTE — TELEPHONE ENCOUNTER
NYLAM to call and schedule surgery with Dr. Langston.   Just recently received her surgery paperwork.

## 2024-12-27 NOTE — TELEPHONE ENCOUNTER
Patient returning call and advised that she no longer would like to proceed with a surgery at this time

## (undated) DEVICE — IV CATH 14 G X 1.75

## (undated) DEVICE — PICO 7 SINGLE 10X30CM: Brand: PICO™ 7

## (undated) DEVICE — SPECIMEN CONTAINER STERILE PEEL PACK

## (undated) DEVICE — 3M™ STERI-STRIP™ REINFORCED ADHESIVE SKIN CLOSURES, R1547, 1/2 IN X 4 IN (12 MM X 100 MM), 6 STRIPS/ENVELOPE: Brand: 3M™ STERI-STRIP™

## (undated) DEVICE — MAYO STAND COVER: Brand: CONVERTORS

## (undated) DEVICE — TRAY FOLEY 16FR SURESTEP TEMP SENS URIMETER STAT LOK

## (undated) DEVICE — SPONGE LAP 18 X 18 IN STRL RFD

## (undated) DEVICE — SCD SEQUENTIAL COMPRESSION COMFORT SLEEVE MEDIUM KNEE LENGTH: Brand: KENDALL SCD

## (undated) DEVICE — PICO 7 SINGLE 10X20CM: Brand: PICO™ 7

## (undated) DEVICE — Device

## (undated) DEVICE — HARVESTER FAT LIPOGRAFTER KIT

## (undated) DEVICE — STERILE MUSCLE FLAP PACK: Brand: CARDINAL HEALTH

## (undated) DEVICE — WIPES INSTRUMENT 3 X 3IN MEROCEL

## (undated) DEVICE — INTENDED FOR TISSUE SEPARATION, AND OTHER PROCEDURES THAT REQUIRE A SHARP SURGICAL BLADE TO PUNCTURE OR CUT.: Brand: BARD-PARKER SAFETY BLADES SIZE 10, STERILE

## (undated) DEVICE — LEGGINGS: Brand: CONVERTORS

## (undated) DEVICE — RETRACTOR WAVEGUIDE WIDE/FLAT

## (undated) DEVICE — GLOVE SRG BIOGEL 7

## (undated) DEVICE — IV CATH INTROCAN 24G X 0.55 SAFETY

## (undated) DEVICE — SUT STRATAFIX SPIRAL PLUS 3-0 PS-2 30 X 30 CM SXMP2B408

## (undated) DEVICE — DISPOSABLE EQUIPMENT COVER: Brand: SMALL TOWEL DRAPE

## (undated) DEVICE — PROXIMATE SKIN STAPLERS (35 WIDE) CONTAINS 35 STAINLESS STEEL STAPLES (FIXED HEAD): Brand: PROXIMATE

## (undated) DEVICE — SKIN MARKER DUAL TIP WITH RULER CAP, FLEXIBLE RULER AND LABELS: Brand: DEVON

## (undated) DEVICE — ELECTRODE BLADE MOD  E-Z CLEAN 6.5IN -0014M

## (undated) DEVICE — JP CHAN DRN SIL HUBLESS 15FR W/TRO: Brand: CARDINAL HEALTH

## (undated) DEVICE — ELECTRODE BLADE MOD E-Z CLEAN 4IN -0014AM

## (undated) DEVICE — SUT MONOCRYL 4-0 PS-2 18 IN Y496G

## (undated) DEVICE — SUCTION FILTER LIPOSUCTION

## (undated) DEVICE — ULTRASOUND GEL STERILE FOIL PK

## (undated) DEVICE — REGULAR TIP OPTHALMIC SPONGE: Brand: MICROSPONGE

## (undated) DEVICE — NEEDLE 25G X 1 1/2

## (undated) DEVICE — NEPTUNE E-SEP SMOKE EVACUATION PENCIL, COATED, 70MM BLADE, PUSH BUTTON SWITCH: Brand: NEPTUNE E-SEP

## (undated) DEVICE — TUBE CONNECTING PSI-TEC FULTER

## (undated) DEVICE — ANTIBACTERIAL UNDYED BRAIDED (POLYGLACTIN 910), SYNTHETIC ABSORBABLE SUTURE: Brand: COATED VICRYL

## (undated) DEVICE — ELECTRODE BLADE MOD E-Z CLEAN 2.5IN 6.4CM -0012M

## (undated) DEVICE — GLOVE INDICATOR PI UNDERGLOVE SZ 7 BLUE

## (undated) DEVICE — GAUZE SPONGES,16 PLY: Brand: CURITY

## (undated) DEVICE — ADHESIVE SKIN CLSR DERMABOND NX

## (undated) DEVICE — SUT MONOCRYL 3-0 RB-1 27 IN Y305H

## (undated) DEVICE — ACE WRAP 6 IN STERILE

## (undated) DEVICE — MAGNETIC INSTRUMENT PAD 16" X 20"; LARGE; DISPOSABLE: Brand: CARDINAL HEALTH

## (undated) DEVICE — 3M™ TEGADERM™ TRANSPARENT FILM DRESSING FRAME STYLE, 1622W, 1-3/4 IN X 1-3/4 IN (4.4 CM X 4.4 CM), 100/CT 4CT/CASE: Brand: 3M™ TEGADERM™

## (undated) DEVICE — HOOK ELASTIC STAY 12MM BLUNT SNGL STRL

## (undated) DEVICE — MICROCLIP HEMOSTATIC SUPERFINE TI

## (undated) DEVICE — PENCIL ELECTROSURG E-Z CLEAN -0035H

## (undated) DEVICE — SUT STRATAFIX SPIRAL PDS PLUS 1 CTX 18 IN SXPP1A400

## (undated) DEVICE — SYRINGE 30ML LL

## (undated) DEVICE — SUT VICRYL 0 CT-1 CR/8 27IN JJ31G

## (undated) DEVICE — RULER 4022800 10PK MICRO-GRID GREEN SIL

## (undated) DEVICE — NEEDLE 21 G X 1 1/2 SAFETY

## (undated) DEVICE — TUBING INFILTRATION SOFTOUCH PUMP

## (undated) DEVICE — LINER SUCTION CANISTER 2000ML DISP

## (undated) DEVICE — OCCLUSIVE GAUZE STRIP,3% BISMUTH TRIBROMOPHENATE IN PETROLATUM BLEND: Brand: XEROFORM

## (undated) DEVICE — HEMOCLIP CARTRIDGE MED

## (undated) DEVICE — PROXIMATE PLUS MD MULTI-DIRECTIONAL RELEASE SKIN STAPLERS CONTAINS 35 STAINLESS STEEL STAPLES APPROXIMATE CLOSED DIMENSIONS: 6.9MM X 3.9MM WIDE: Brand: PROXIMATE

## (undated) DEVICE — SUT MONOCRYL 3-0 SH 27 IN Y416H

## (undated) DEVICE — SUT SILK 2-0 SH 30 IN K833H

## (undated) DEVICE — BINDER ABDOMINAL 46-62 IN

## (undated) DEVICE — SPONGE 4 X 4 XRAY 16 PLY STRL LF RFD

## (undated) DEVICE — ADHESIVE SKIN HIGH VISCOSITY EXOFIN 1ML

## (undated) DEVICE — SEALANT FIBRIN VISTASEAL 10ML

## (undated) DEVICE — INTENDED FOR TISSUE SEPARATION, AND OTHER PROCEDURES THAT REQUIRE A SHARP SURGICAL BLADE TO PUNCTURE OR CUT.: Brand: BARD-PARKER ® CARBON RIB-BACK BLADES

## (undated) DEVICE — CHLORAPREP HI-LITE 26ML ORANGE

## (undated) DEVICE — SUT VICRYL 3-0 SH 27 IN J416H

## (undated) DEVICE — TUBING ASPIRATION LIPOSUCTION SET 12FT

## (undated) DEVICE — ELECTRODE NEEDLE MOD E-Z CLEAN 2.75IN 7CM -0013M

## (undated) DEVICE — ROSEBUD DISSECTORS: Brand: DEROYAL

## (undated) DEVICE — MICROCLIP GEM TI 2.4-3.1MM

## (undated) DEVICE — BIPOLAR CORD DISP

## (undated) DEVICE — MEDI-VAC YANK SUCT HNDL W/TPRD BULBOUS TIP: Brand: CARDINAL HEALTH

## (undated) DEVICE — PREMIUM DRY TRAY LF: Brand: MEDLINE INDUSTRIES, INC.

## (undated) DEVICE — POV-IOD SOLUTION 4OZ BT

## (undated) DEVICE — VIOLET BRAIDED (POLYGLACTIN 910), SYNTHETIC ABSORBABLE SUTURE: Brand: COATED VICRYL

## (undated) DEVICE — SUT ETHILON 9-0 BV130-5 5 IN 2809G

## (undated) DEVICE — ALL PURPOSE SPONGES,NON-WOVEN, 3 PLY: Brand: CURITY

## (undated) DEVICE — HEAVY DRAINAGE PACK: Brand: CURITY

## (undated) DEVICE — JP CHAN DRN SIL HUBLESS 19FR W/TRO: Brand: CARDINAL HEALTH

## (undated) DEVICE — SUT PROLENE 6-0 PC-1 18 IN 8617G

## (undated) DEVICE — POOLE SUCTION INSTRUMENT WITH REMOVABLE SHEATH AND PREATTACHED 6' (1.8 M) CLEAR PLASTIC TUBING: Brand: POOLE

## (undated) DEVICE — DRAPE FLUID WARMER (BIRD BATH)

## (undated) DEVICE — DRESSING BIOPATCH ANTIMICROBIAL 1 IN DISC

## (undated) DEVICE — SURGIFOAM 8.5 X 12.5

## (undated) DEVICE — SUT PLAIN 5-0 PC-1 18 IN 1915G

## (undated) DEVICE — IV CATH 14 G X 3 1/4 IN

## (undated) DEVICE — JACKSON-PRATT 100CC BULB RESERVOIR: Brand: CARDINAL HEALTH

## (undated) DEVICE — DRAPE MICROSCOPE OPMI PENTERO

## (undated) DEVICE — HEMOCLIP CARTRIDGE SM

## (undated) DEVICE — SUT STRATAFIX SPIRAL MONOCRYL PLUS 3-0 PS-2 45CM SXMP1B107

## (undated) DEVICE — ARTHROSCOPY FLOOR MAT

## (undated) DEVICE — ADHESIVE SKIN HIGH VISCOSITY EXOFIN MICRO 0.5ML

## (undated) DEVICE — DRAIN CHANNEL RND FULL FLUTED HUBLESS 19FR

## (undated) DEVICE — 3M™ TEGADERM™ TRANSPARENT FILM DRESSING FRAME STYLE, 1628, 6 IN X 8 IN (15 CM X 20 CM), 10/CT 8CT/CASE: Brand: 3M™ TEGADERM™

## (undated) DEVICE — SYRINGE 10ML LL

## (undated) DEVICE — 40601 PROLONGED POSITIONING SYSTEM: Brand: 40601 PROLONGED POSITIONING SYSTEM

## (undated) DEVICE — 3M™ TEGADERM™ TRANSPARENT FILM DRESSING FRAME STYLE, 1626W, 4 IN X 4-3/4 IN (10 CM X 12 CM), 50/CT 4CT/CASE: Brand: 3M™ TEGADERM™

## (undated) DEVICE — LIGHT GLOVE GREEN

## (undated) DEVICE — INTENDED FOR TISSUE SEPARATION, AND OTHER PROCEDURES THAT REQUIRE A SHARP SURGICAL BLADE TO PUNCTURE OR CUT.: Brand: BARD-PARKER SAFETY BLADES SIZE 15, STERILE